# Patient Record
Sex: MALE
[De-identification: names, ages, dates, MRNs, and addresses within clinical notes are randomized per-mention and may not be internally consistent; named-entity substitution may affect disease eponyms.]

---

## 2018-02-11 ENCOUNTER — HOSPITAL ENCOUNTER (INPATIENT)
Dept: HOSPITAL 5 - ED | Age: 73
LOS: 6 days | DRG: 871 | End: 2018-02-17
Attending: INTERNAL MEDICINE | Admitting: INTERNAL MEDICINE
Payer: MEDICARE

## 2018-02-11 DIAGNOSIS — I47.1: ICD-10-CM

## 2018-02-11 DIAGNOSIS — Z23: ICD-10-CM

## 2018-02-11 DIAGNOSIS — D69.6: ICD-10-CM

## 2018-02-11 DIAGNOSIS — I27.20: ICD-10-CM

## 2018-02-11 DIAGNOSIS — F31.9: ICD-10-CM

## 2018-02-11 DIAGNOSIS — Z60.2: ICD-10-CM

## 2018-02-11 DIAGNOSIS — I50.33: ICD-10-CM

## 2018-02-11 DIAGNOSIS — J44.1: ICD-10-CM

## 2018-02-11 DIAGNOSIS — K21.9: ICD-10-CM

## 2018-02-11 DIAGNOSIS — K72.90: ICD-10-CM

## 2018-02-11 DIAGNOSIS — N39.0: ICD-10-CM

## 2018-02-11 DIAGNOSIS — Z82.49: ICD-10-CM

## 2018-02-11 DIAGNOSIS — D64.9: ICD-10-CM

## 2018-02-11 DIAGNOSIS — Z66: ICD-10-CM

## 2018-02-11 DIAGNOSIS — E43: ICD-10-CM

## 2018-02-11 DIAGNOSIS — K74.60: ICD-10-CM

## 2018-02-11 DIAGNOSIS — J69.0: ICD-10-CM

## 2018-02-11 DIAGNOSIS — E11.9: ICD-10-CM

## 2018-02-11 DIAGNOSIS — A41.51: Primary | ICD-10-CM

## 2018-02-11 DIAGNOSIS — F17.210: ICD-10-CM

## 2018-02-11 DIAGNOSIS — I11.0: ICD-10-CM

## 2018-02-11 DIAGNOSIS — F20.9: ICD-10-CM

## 2018-02-11 DIAGNOSIS — J96.01: ICD-10-CM

## 2018-02-11 DIAGNOSIS — Z88.2: ICD-10-CM

## 2018-02-11 LAB
%HYPO/RBC NFR BLD AUTO: (no result) %
ALBUMIN SERPL-MCNC: 2.5 G/DL (ref 3.9–5)
ALT SERPL-CCNC: 28 UNITS/L (ref 7–56)
ANISOCYTOSIS BLD QL SMEAR: (no result)
BACTERIA #/AREA URNS HPF: (no result) /HPF
BAND NEUTROPHILS # (MANUAL): 1.3 K/MM3
BILIRUB UR QL STRIP: (no result)
BLOOD UR QL VISUAL: (no result)
BUN SERPL-MCNC: 30 MG/DL (ref 9–20)
BUN/CREAT SERPL: 60 %
CALCIUM SERPL-MCNC: 9.4 MG/DL (ref 8.4–10.2)
HCT VFR BLD CALC: 32.3 % (ref 35.5–45.6)
HEMOLYSIS INDEX: 53
HGB BLD-MCNC: 9.9 GM/DL (ref 11.8–15.2)
INR PPP: 1.24 (ref 0.87–1.13)
MCH RBC QN AUTO: 25 PG (ref 28–32)
MCHC RBC AUTO-ENTMCNC: 31 % (ref 32–34)
MCV RBC AUTO: 81 FL (ref 84–94)
MUCOUS THREADS #/AREA URNS HPF: (no result) /HPF
MYELOCYTES # (MANUAL): 0 K/MM3
NITRITE UR QL STRIP: (no result)
PH UR STRIP: 6 [PH] (ref 5–7)
PLATELET # BLD: 60 K/MM3 (ref 140–440)
PROMYELOCYTES # (MANUAL): 0 K/MM3
PROT UR STRIP-MCNC: (no result) MG/DL
RBC # BLD AUTO: 4.01 M/MM3 (ref 3.65–5.03)
RBC #/AREA URNS HPF: 3 /HPF (ref 0–6)
TOTAL CELLS COUNTED BLD: 100
UROBILINOGEN UR-MCNC: 4 MG/DL (ref ?–2)
WBC #/AREA URNS HPF: 1 /HPF (ref 0–6)

## 2018-02-11 PROCEDURE — 36415 COLL VENOUS BLD VENIPUNCTURE: CPT

## 2018-02-11 PROCEDURE — 83880 ASSAY OF NATRIURETIC PEPTIDE: CPT

## 2018-02-11 PROCEDURE — 82805 BLOOD GASES W/O2 SATURATION: CPT

## 2018-02-11 PROCEDURE — 87040 BLOOD CULTURE FOR BACTERIA: CPT

## 2018-02-11 PROCEDURE — 94640 AIRWAY INHALATION TREATMENT: CPT

## 2018-02-11 PROCEDURE — 87086 URINE CULTURE/COLONY COUNT: CPT

## 2018-02-11 PROCEDURE — 85025 COMPLETE CBC W/AUTO DIFF WBC: CPT

## 2018-02-11 PROCEDURE — 94760 N-INVAS EAR/PLS OXIMETRY 1: CPT

## 2018-02-11 PROCEDURE — 81001 URINALYSIS AUTO W/SCOPE: CPT

## 2018-02-11 PROCEDURE — 85007 BL SMEAR W/DIFF WBC COUNT: CPT

## 2018-02-11 PROCEDURE — 80048 BASIC METABOLIC PNL TOTAL CA: CPT

## 2018-02-11 PROCEDURE — 85379 FIBRIN DEGRADATION QUANT: CPT

## 2018-02-11 PROCEDURE — 93010 ELECTROCARDIOGRAM REPORT: CPT

## 2018-02-11 PROCEDURE — 76604 US EXAM CHEST: CPT

## 2018-02-11 PROCEDURE — 85610 PROTHROMBIN TIME: CPT

## 2018-02-11 PROCEDURE — 96365 THER/PROPH/DIAG IV INF INIT: CPT

## 2018-02-11 PROCEDURE — 99406 BEHAV CHNG SMOKING 3-10 MIN: CPT

## 2018-02-11 PROCEDURE — 93306 TTE W/DOPPLER COMPLETE: CPT

## 2018-02-11 PROCEDURE — 82140 ASSAY OF AMMONIA: CPT

## 2018-02-11 PROCEDURE — 84443 ASSAY THYROID STIM HORMONE: CPT

## 2018-02-11 PROCEDURE — 87186 SC STD MICRODIL/AGAR DIL: CPT

## 2018-02-11 PROCEDURE — 87076 CULTURE ANAEROBE IDENT EACH: CPT

## 2018-02-11 PROCEDURE — 90686 IIV4 VACC NO PRSV 0.5 ML IM: CPT

## 2018-02-11 PROCEDURE — 96367 TX/PROPH/DG ADDL SEQ IV INF: CPT

## 2018-02-11 PROCEDURE — 83735 ASSAY OF MAGNESIUM: CPT

## 2018-02-11 PROCEDURE — 71275 CT ANGIOGRAPHY CHEST: CPT

## 2018-02-11 PROCEDURE — 82550 ASSAY OF CK (CPK): CPT

## 2018-02-11 PROCEDURE — 71046 X-RAY EXAM CHEST 2 VIEWS: CPT

## 2018-02-11 PROCEDURE — 84439 ASSAY OF FREE THYROXINE: CPT

## 2018-02-11 PROCEDURE — 93005 ELECTROCARDIOGRAM TRACING: CPT

## 2018-02-11 PROCEDURE — 90732 PPSV23 VACC 2 YRS+ SUBQ/IM: CPT

## 2018-02-11 PROCEDURE — 99285 EMERGENCY DEPT VISIT HI MDM: CPT

## 2018-02-11 PROCEDURE — 82553 CREATINE MB FRACTION: CPT

## 2018-02-11 PROCEDURE — 84484 ASSAY OF TROPONIN QUANT: CPT

## 2018-02-11 PROCEDURE — 80053 COMPREHEN METABOLIC PANEL: CPT

## 2018-02-11 PROCEDURE — 71045 X-RAY EXAM CHEST 1 VIEW: CPT

## 2018-02-11 PROCEDURE — 96375 TX/PRO/DX INJ NEW DRUG ADDON: CPT

## 2018-02-11 SDOH — SOCIAL STABILITY - SOCIAL INSECURITY: PROBLEMS RELATED TO LIVING ALONE: Z60.2

## 2018-02-11 NOTE — HISTORY AND PHYSICAL REPORT
History of Present Illness


Date of examination: 02/11/18


History of present illness: 


72-year-old man who has been on hospice, refocus hospice today to come to the 

emergency roomfor evaluation.  He has a history of COPD hep C, cirrhosis, CHF, 

they state that on Wednesday he felt, experienced generalized weakness and has 

a cough productive of brown phlegm, fever chills and shortness of breath


Review Of  Systems:


Constitutional: no weight loss


Ears, eyes, nose, mouth and throat: no nasal congestion, no nasal discharge, no 

sinus pressure, blurry vision, diplopia


Neck: No neck pain or rigidity.


Cardiovascular: No chest pain,  palpitations


Respiratory: + shortness of breath, cough


Gastrointestinal: No abdominal pain, hematochezia


Genitourinary : no dysuria, frequency , hematuria


Musculoskeletal: no muscle ache 


Integumentary: no rash, no pruritis


Neurological: no parathesias, focal weakness


Endocrine: no cold or heat intolerance, no polyuria or polydipsia


Hematologic/Lymphatic: no easy bruising, no easy bleeding, no gland swelling


Allergic/Immunologic: no urticaria, no angioedema.





PAST MEDICAL HISTORY: COPD hep C, cirrhosis, CHF





PAST SURGICAL HISTORY: None





FAMILY HISTORY: Hypertension





SOCIAL HISTORY: Smoked 1-1/2 packs a day,no alcohol or drugs








Medications and Allergies


 Allergies











Allergy/AdvReac Type Severity Reaction Status Date / Time


 


Sulfa (Sulfonamide Allergy  Unknown Verified 02/12/15 13:45





Antibiotics)     











 Home Medications











 Medication  Instructions  Recorded  Confirmed  Last Taken  Type


 


ALBUTEROL Inhaler [ProAir HFA 2 puff IH QID PRN #1 inhalation 10/10/13 07/30/15 

07/29/15 Rx





Inhaler]     


 


Metoprolol [Lopressor TAB] 25 mg PO BID #60 tablet 10/10/13 07/30/15 07/29/15 Rx


 


Multivitamin [Multi-Vitamin Daily] 1 each PO DAILY #30 tablet 10/10/13 07/30/15 

07/29/15 Rx


 


Folic Acid [Folvite] 1 mg PO QDAY #30 tablet 01/09/14 07/30/15 07/29/15 Rx


 


Multivitamin Tab [Multiple Vitamin 1 each PO DAILY 30 Days  tablet 01/09/14 07/

30/15 07/29/15 Rx





TAB (Theragran)]     


 


Thiamine [Vitamin B-1] 100 mg PO QDAY #30 tablet 01/09/14 07/30/15 07/29/15 Rx


 


Budesonide [Pulmicort Respules] 0.5 mg IH Q12HRT #1 nebu 02/18/15 07/30/15 07/29

/15 Rx


 


Furosemide [Lasix] 40 mg PO DAILY #30 tablet 02/18/15 07/30/15 07/29/15 Rx


 


HYDROcodone/APAP 5-325 [Norco 1 each PO Q6H PRN #30 tablet 02/18/15 07/30/15 07/

29/15 Rx





5-325 mg TAB]     


 


Ipratropium/Albuterol Sulfate 1 ampul IH TIDRT #30 ampul.neb 02/18/15 07/30/15 

07/29/15 Rx





[DUONEB *Not for PRN Use*]     


 


Pantoprazole [Protonix] 40 mg PO QDAY #30 tablet 02/18/15 07/30/15 07/29/15 Rx


 


Spironolactone [Aldactone] 50 mg PO QDAY #30 tablet 02/18/15 07/30/15 07/29/15 

Rx


 


predniSONE [Deltasone] 20 mg PO QDAY #18 tab 02/18/15 07/30/15 07/29/15 Rx











Active Meds: 


Active Medications





Ceftriaxone Sodium 1 gm/ (Sodium Chloride)  20 mls @ 20 mls/10 min IV ONCE.ED 

ONE


   Stop: 02/11/18 22:09











Exam





- Physical Exam


Narrative exam: 


Gen. appearance: Patient lying in bed in no acute distress


HEENT: Normocephalic/atraumatic, pupils equal round reactive to light, extra 

occular movement intact, no scleral icterus, no JVD or thyromegaly or nodule, 

neck is supple, mucous membrane moist, no erythema or exudate


Heart: S1-S2, regular rate and rhythm


Lungs: Crackles with decreased breath sounds l breathing comfortable


Abdomen: Positive bowel sounds, nontender, nondistended, no organomegaly


Extremities: No edema, cyanosis, clubbing


Neuro:: Oriented 3 , cranial nerves II-12 intact, speech, motor intact


Skin: No rash, nodules, warm dry








- Constitutional


Vitals: 


 











Temp Pulse Resp BP Pulse Ox


 


 98.2 F   89   16   119/63   99 


 


 02/11/18 16:32  02/11/18 16:32  02/11/18 16:32  02/11/18 18:30  02/11/18 18:00














Results





- Labs


CBC & Chem 7: 


 02/12/18 04:49





 02/12/18 04:49


Labs: 


 Abnormal lab results











  02/11/18 02/11/18 02/11/18 Range/Units





  16:40 16:40 16:40 


 


Hgb     (11.8-15.2)  gm/dl


 


Hct     (35.5-45.6)  %


 


MCV     (84-94)  fl


 


MCH     (28-32)  pg


 


MCHC     (32-34)  %


 


RDW     (13.2-15.2)  %


 


Plt Count     (140-440)  K/mm3


 


Lymphocytes % (Manual)     (13.4-35.0)  %


 


Lymphocytes # (Manual)     (1.2-5.4)  K/mm3


 


PT  16.3 H    (12.2-14.9)  Sec.


 


INR  1.24 H    (0.87-1.13)  


 


D-Dimer  1843.68 H    (0-234)  ng/mlDDU


 


Potassium   5.1 H   (3.6-5.0)  mmol/L


 


Chloride   97.4 L   ()  mmol/L


 


Carbon Dioxide   32 H   (22-30)  mmol/L


 


BUN   30 H   (9-20)  mg/dL


 


Creatinine   0.5 L   (0.8-1.5)  mg/dL


 


AST   58 H   (5-40)  units/L


 


NT-Pro-B Natriuret Pep    6520 H  (0-900)  pg/mL


 


Total Protein   5.9 L   (6.3-8.2)  g/dL


 


Albumin   2.5 L   (3.9-5)  g/dL














  02/11/18 Range/Units





  18:10 


 


Hgb  9.9 L  (11.8-15.2)  gm/dl


 


Hct  32.3 L  (35.5-45.6)  %


 


MCV  81 L  (84-94)  fl


 


MCH  25 L  (28-32)  pg


 


MCHC  31 L  (32-34)  %


 


RDW  24.6 H  (13.2-15.2)  %


 


Plt Count  60 L  (140-440)  K/mm3


 


Lymphocytes % (Manual)  3.0 L  (13.4-35.0)  %


 


Lymphocytes # (Manual)  0.2 L  (1.2-5.4)  K/mm3


 


PT   (12.2-14.9)  Sec.


 


INR   (0.87-1.13)  


 


D-Dimer   (0-234)  ng/mlDDU


 


Potassium   (3.6-5.0)  mmol/L


 


Chloride   ()  mmol/L


 


Carbon Dioxide   (22-30)  mmol/L


 


BUN   (9-20)  mg/dL


 


Creatinine   (0.8-1.5)  mg/dL


 


AST   (5-40)  units/L


 


NT-Pro-B Natriuret Pep   (0-900)  pg/mL


 


Total Protein   (6.3-8.2)  g/dL


 


Albumin   (3.9-5)  g/dL














- Imaging and Cardiology


EKG: image reviewed


Chest x-ray: image reviewed


CT scan - chest: report reviewed





Assessment and Plan


Assessment


Community-acquired pneumonia


CHF, stable/pleural effusion


Cirrhosis


COPD


Thrombocytopenia





Plan


Admit to medicine


Start IV Levaquin, follow cultures


Continue appropriate outpatient medication


DVT prophylaxis

## 2018-02-11 NOTE — CAT SCAN REPORT
FINAL REPORT



PROCEDURE:  CT ANGIO CHEST



TECHNIQUE:  Computerized tomographic angiography of the chest was

performed during the IV injection of iodinated nonionic contrast

including image processing. The image data was postprocessed

using 2-dimensional multiplanar reformatted (MPR) and

3-dimensional (MIP and/or volume rendered) techniques. 



HISTORY:  sob. elevated d-dimer. Evaluate pulmonary embolus 



COMPARISON:  No prior studies are available for comparison.



FINDINGS:  

Pulmonary outflow tract, right and left main pulmonary arteries

and their proximal branches: Clear, no filling defects seen to

suggest pulmonary embolus.



Pericardium: No evidence of pericardial effusion. Heart size

upper normal. 



Thoracic aorta: Atherosclerotic changes are visualized, no

evidence of aneurysmal dilatation or dissection.



Coronary arteries: Are partially calcified indicating

atherosclerotic disease.



Mediastinum and hilar regions: Nonspecific subcentimeter lymph

nodes are visualized. No pathologically enlarged lymph nodes or

masses are identified. 



Lung Fields: There is a moderate size right pleural effusion.

There is dense consolidation visualized in the right lower lobe.

Air bronchograms are visualized. Severe emphysematous changes

seen throughout both lungs. No discrete masses are identified.

Small amount of atelectasis seen in the left base. No effusion is

seen on the left. Moderate fibrosis seen in the upper lung fields

bilaterally. Numerous gas bubbles are seen in the superior aspect

of the right lower lobe. This could be related to the

emphysematous changes present. I cannot exclude developing

abscess. 



Upper abdomen: There appears to be a moderate amount of ascites

is collected along the edges of the liver and spleen. The edge of

the liver appears nodular suggesting cirrhosis. Varices are

suspected in the left upper quadrant although poorly visualized

on this arterial phase imaging. There is a 1.5 x 2.7 centimeter

nodule in the left adrenal gland.



Other: No acute bony abnormalities are identified.



IMPRESSION:  

No evidence of pulmonary embolus.



Moderate size right effusion present. Dense consolidations seen

throughout the right lower lobe as described suggesting severe

pneumonia. Emphysematous changes are present. Developing abscess

cannot be excluded. Please see above comments. Mass in the

consolidated right lower lobe cannot be entirely excluded. Small

amount of atelectasis seen in the left lung base.



Severe emphysematous changes are present. Moderate fibrosis seen

in the upper lung fields.



Moderate ascites visualized in the upper abdomen. Cirrhosis of

the liver suspected as well as portal hypertension.



Indeterminate nodule left adrenal gland. This could represent an

adrenal adenoma. Other masses are not excluded.



Atherosclerosis coronary arteries.

## 2018-02-11 NOTE — EMERGENCY DEPARTMENT REPORT
ED Chest Pain HPI





- General


Chief Complaint: Weakness


Stated Complaint: WEAKNESS


Time Seen by Provider: 02/11/18 16:40


Source: EMS


Mode of arrival: Stretcher


Limitations: Physical Limitation





- History of Present Illness


Initial Comments: 


This is a 72-year-old male that presents to the emergency room via EMS for 

weakness, shortness of breath, chest pain 1 week.  Patient states getting 

worse.  Patient A&O 4.  Patient states chest pain is left chest and 

nonradiating.  Patient states shortness of breath is worsening patient also 

complains of dyspnea on exertion.  Patient has had multiple falls due to 

weakness.  Patient recently replicate off of hospice to come to the emergency 

room.  patient was on hospice for end-stage liver disease.  Patient has a past 

medical history of congestive heart failure, COPD, diabetes, hypertension, 

hepatitis C, bipolar, schizophrenia, end-stage liver disease.  Patient states 

she wants everything done. 





MD Complaint: chest pain


-: Gradual, week(s)


Onset: during rest, during exertion


Pain Location: substernal, left chest


Pain Radiation: none


Severity: moderate, severe


Severity scale (0 -10): 5


Quality: tightness, aching, heaviness


Consistency: constant


Improves With: rest, leaning foward





- Related Data


 Previous Rx's











 Medication  Instructions  Recorded  Last Taken  Type


 


ALBUTEROL Inhaler [ProAir HFA 2 puff IH QID PRN #1 inhalation 10/10/13 07/29/15 

Rx





Inhaler]    


 


Metoprolol [Lopressor TAB] 25 mg PO BID #60 tablet 10/10/13 07/29/15 Rx


 


Multivitamin [Multi-Vitamin Daily] 1 each PO DAILY #30 tablet 10/10/13 07/29/15 

Rx


 


Folic Acid [Folvite] 1 mg PO QDAY #30 tablet 01/09/14 07/29/15 Rx


 


Multivitamin Tab [Multiple Vitamin 1 each PO DAILY 30 Days  tablet 01/09/14 07/

29/15 Rx





TAB (Theragran)]    


 


Thiamine [Vitamin B-1] 100 mg PO QDAY #30 tablet 01/09/14 07/29/15 Rx


 


Budesonide [Pulmicort Respules] 0.5 mg IH Q12HRT #1 nebu 02/18/15 07/29/15 Rx


 


Furosemide [Lasix] 40 mg PO DAILY #30 tablet 02/18/15 07/29/15 Rx


 


HYDROcodone/APAP 5-325 [Norco 1 each PO Q6H PRN #30 tablet 02/18/15 07/29/15 Rx





5-325 mg TAB]    


 


Ipratropium/Albuterol Sulfate 1 ampul IH TIDRT #30 ampul.neb 02/18/15 07/29/15 

Rx





[DUONEB *Not for PRN Use*]    


 


Pantoprazole [Protonix] 40 mg PO QDAY #30 tablet 02/18/15 07/29/15 Rx


 


Spironolactone [Aldactone] 50 mg PO QDAY #30 tablet 02/18/15 07/29/15 Rx


 


predniSONE [Deltasone] 20 mg PO QDAY #18 tab 02/18/15 07/29/15 Rx











 Allergies











Allergy/AdvReac Type Severity Reaction Status Date / Time


 


Sulfa (Sulfonamide Allergy  Unknown Verified 02/12/15 13:45





Antibiotics)     














Heart Score





- HEART Score


History: Slightly suspicious


EKG: Normal


Age: > 65


Risk factors: > 3 risk factors or hx of atherosclerotic disease


Troponin: < normal limit


HEART Score: 4





ED Review of Systems


ROS: 


Stated complaint: WEAKNESS


Other details as noted in HPI





Comment: All other systems reviewed and negative


Constitutional: malaise, weakness.  denies: chills, fever


Eyes: denies: eye pain, eye discharge, vision change


ENT: denies: ear pain, throat pain


Respiratory: cough, shortness of breath, SOB with exertion, SOB at rest.  denies

: wheezing


Cardiovascular: chest pain.  denies: palpitations


Endocrine: no symptoms reported


Gastrointestinal: denies: abdominal pain, nausea, diarrhea


Genitourinary: denies: urgency, dysuria


Musculoskeletal: denies: back pain, joint swelling, arthralgia


Skin: denies: rash, lesions


Neurological: weakness, abnormal gait.  denies: headache, paresthesias


Psychiatric: denies: anxiety, depression


Hematological/Lymphatic: denies: easy bleeding, easy bruising





ED Past Medical Hx





- Past Medical History


Previous Medical History?: Yes


Hx Hypertension: Yes


Hx Congestive Heart Failure: Yes


Hx Diabetes: No


Hx Psychiatric Treatment: Yes


Hx COPD: Yes


Additional medical history: Hep C, Bipolar, Schziophrenia him on patient was 

seen by psychiatry on his last admission who felt he did not meet criteria for 

schizophrenia or bipolar disorder.  Patient's major problem appears to be 

drinking.





- Surgical History


Additional Surgical History: unknown





- Family History


Family history: no significant, hypertension





- Social History


Smoking Status: Never Smoker


Substance Use Type: Alcohol





- Medications


Home Medications: 


 Home Medications











 Medication  Instructions  Recorded  Confirmed  Last Taken  Type


 


ALBUTEROL Inhaler [ProAir HFA 2 puff IH QID PRN #1 inhalation 10/10/13 07/30/15 

07/29/15 Rx





Inhaler]     


 


Metoprolol [Lopressor TAB] 25 mg PO BID #60 tablet 10/10/13 07/30/15 07/29/15 Rx


 


Multivitamin [Multi-Vitamin Daily] 1 each PO DAILY #30 tablet 10/10/13 07/30/15 

07/29/15 Rx


 


Folic Acid [Folvite] 1 mg PO QDAY #30 tablet 01/09/14 07/30/15 07/29/15 Rx


 


Multivitamin Tab [Multiple Vitamin 1 each PO DAILY 30 Days  tablet 01/09/14 07/

30/15 07/29/15 Rx





TAB (Theragran)]     


 


Thiamine [Vitamin B-1] 100 mg PO QDAY #30 tablet 01/09/14 07/30/15 07/29/15 Rx


 


Budesonide [Pulmicort Respules] 0.5 mg IH Q12HRT #1 nebu 02/18/15 07/30/15 07/29

/15 Rx


 


Furosemide [Lasix] 40 mg PO DAILY #30 tablet 02/18/15 07/30/15 07/29/15 Rx


 


HYDROcodone/APAP 5-325 [Norco 1 each PO Q6H PRN #30 tablet 02/18/15 07/30/15 07/

29/15 Rx





5-325 mg TAB]     


 


Ipratropium/Albuterol Sulfate 1 ampul IH TIDRT #30 ampul.neb 02/18/15 07/30/15 

07/29/15 Rx





[DUONEB *Not for PRN Use*]     


 


Pantoprazole [Protonix] 40 mg PO QDAY #30 tablet 02/18/15 07/30/15 07/29/15 Rx


 


Spironolactone [Aldactone] 50 mg PO QDAY #30 tablet 02/18/15 07/30/15 07/29/15 

Rx


 


predniSONE [Deltasone] 20 mg PO QDAY #18 tab 02/18/15 07/30/15 07/29/15 Rx














ED Physical Exam





- General


Limitations: Physical Limitation


General appearance: alert, in no apparent distress, cachectic





- Head


Head exam: Present: atraumatic, normocephalic





- Eye


Eye exam: Present: normal appearance, PERRL





- ENT


ENT exam: Present: mucous membranes dry, TM's normal bilaterally





- Neck


Neck exam: Present: normal inspection





- Respiratory


Respiratory exam: Present: normal lung sounds bilaterally.  Absent: respiratory 

distress





- Cardiovascular


Cardiovascular Exam: Present: regular rate, normal rhythm.  Absent: systolic 

murmur, diastolic murmur, rubs, gallop





- GI/Abdominal


GI/Abdominal exam: Present: soft, normal bowel sounds





- Rectal


Rectal exam: Present: deferred





- Extremities Exam


Extremities exam: Present: normal inspection





- Back Exam


Back exam: Present: normal inspection





- Neurological Exam


Neurological exam: Present: alert, oriented X3





- Psychiatric


Psychiatric exam: Present: normal affect, normal mood





- Skin


Skin exam: Present: warm, dry, intact, normal color.  Absent: rash





ED Course


 Vital Signs











  02/11/18 02/11/18 02/11/18





  15:53 16:32 16:48


 


Temperature 98.0 F 98.2 F 


 


Pulse Rate 66 89 


 


Respiratory 22 16 





Rate   


 


Blood Pressure 112/51  124/59


 


Blood Pressure  120/58 





[Right]   


 


O2 Sat by Pulse 93  





Oximetry   














  02/11/18 02/11/18 02/11/18





  17:01 17:31 18:00


 


Temperature   


 


Pulse Rate   


 


Respiratory   





Rate   


 


Blood Pressure  124/59 123/70


 


Blood Pressure   





[Right]   


 


O2 Sat by Pulse 95 97 99





Oximetry   














  02/11/18





  18:30


 


Temperature 


 


Pulse Rate 


 


Respiratory 





Rate 


 


Blood Pressure 119/63


 


Blood Pressure 





[Right] 


 


O2 Sat by Pulse 





Oximetry 














- Reevaluation(s)


Reevaluation #1: 





02/11/18 18:11.  Patient ready for admission however waiting on CTA results.  

Patient seen and no changes





Reevaluation #2: 





02/11/18 21:12. pt ready for admission. cta neg. discussed with dr mcdaniel.  

Hospitalist will admit.  








PATRICK score





- Patrick Score


Age > 65: (1) Yes


Aspirin use within the Past 7 Days: (0) No


3 or more CAD Risk Factors: (1) Yes


2 or more Angina events in past 24 hrs: (1) Yes


Known CAD with more than 50% Stenosis: (0) No


Elevated Cardiac Markers: (0) No


ST Deviation Greater than 0.5mm: (0) No


PATRICK Score: 3





ED Medical Decision Making





- Lab Data


Result diagrams: 


 02/11/18 18:10





 02/11/18 16:40





- EKG Data


-: EKG Interpreted by Me


EKG shows normal: sinus rhythm, axis (right)


Rate: normal





- Radiology Data


Radiology results: report reviewed, image reviewed


Right lung pneumonia


.  CTA no PE





- Medical Decision Making


Will admit patient to hospital for further evaluation.  Discussed case with the 

hospitalist.  Hospitalist will admit.  Discussed all labs with family and 

patient. 








- Differential Diagnosis


chf. failure to thrive.  Pneumonia


Critical Care Time: No


Critical care attestation.: 


If time is entered above; I have spent that time in minutes in the direct care 

of this critically ill patient, excluding procedure time.








ED Disposition


Clinical Impression: 


 Chest pain, Shortness of breath, Weakness, Pneumonia, Elevated d-dimer, CHF 

exacerbation, CHF (congestive heart failure), Liver cirrhosis, COPD exacerbation

, Hypertension, Anemia, Thrombocytopenia due to diminished platelet production





Disposition: [id number] OP ADMIT IP TO THIS HOSP


Is pt being admited?: Yes


Does the pt Need Aspirin: No


Condition: Critical


Time of Disposition: 21:05

## 2018-02-11 NOTE — XRAY REPORT
FINAL REPORT



EXAM:  XR CHEST 1V AP



HISTORY:  possible Sepsis 



TECHNIQUE:  Chest single AP 



PRIORS:  None.



FINDINGS:  

There is confluent patchy opacity within the right lower lobe

distribution. Positioning is rotated. Left lung demonstrates no

acute change. There is mild pulmonary interstitial prominence

bilaterally most likely chronic. Cardiac and mediastinal contours

are unremarkable. 



IMPRESSION:  

Right lower lobe pulmonary infiltrates suspicious for pneumonia

## 2018-02-12 LAB
%HYPO/RBC NFR BLD AUTO: (no result) %
ANISOCYTOSIS BLD QL SMEAR: (no result)
BAND NEUTROPHILS # (MANUAL): 0.2 K/MM3
BUN SERPL-MCNC: 29 MG/DL (ref 9–20)
BUN/CREAT SERPL: 58 %
CALCIUM SERPL-MCNC: 9.4 MG/DL (ref 8.4–10.2)
HCT VFR BLD CALC: 35 % (ref 35.5–45.6)
HEMOLYSIS INDEX: 5
HGB BLD-MCNC: 11.1 GM/DL (ref 11.8–15.2)
MCH RBC QN AUTO: 25 PG (ref 28–32)
MCHC RBC AUTO-ENTMCNC: 32 % (ref 32–34)
MCV RBC AUTO: 78 FL (ref 84–94)
MYELOCYTES # (MANUAL): 0.1 K/MM3
PLATELET # BLD: 67 K/MM3 (ref 140–440)
PROMYELOCYTES # (MANUAL): 0 K/MM3
RBC # BLD AUTO: 4.49 M/MM3 (ref 3.65–5.03)
TOTAL CELLS COUNTED BLD: 100

## 2018-02-12 RX ADMIN — PANTOPRAZOLE SODIUM SCH MG: 40 TABLET, DELAYED RELEASE ORAL at 10:45

## 2018-02-12 RX ADMIN — ENOXAPARIN SODIUM SCH MG: 100 INJECTION SUBCUTANEOUS at 22:22

## 2018-02-12 RX ADMIN — LEVOFLOXACIN SCH MLS/HR: 5 INJECTION, SOLUTION INTRAVENOUS at 10:45

## 2018-02-12 RX ADMIN — BUDESONIDE SCH: 0.5 INHALANT RESPIRATORY (INHALATION) at 21:52

## 2018-02-12 RX ADMIN — IPRATROPIUM BROMIDE AND ALBUTEROL SULFATE SCH: .5; 3 SOLUTION RESPIRATORY (INHALATION) at 21:52

## 2018-02-12 RX ADMIN — FOLIC ACID SCH MG: 1 TABLET ORAL at 10:45

## 2018-02-12 RX ADMIN — AZITHROMYCIN SCH MLS/HR: 500 INJECTION, POWDER, LYOPHILIZED, FOR SOLUTION INTRAVENOUS at 22:23

## 2018-02-12 RX ADMIN — Medication SCH MG: at 10:46

## 2018-02-12 RX ADMIN — IPRATROPIUM BROMIDE AND ALBUTEROL SULFATE SCH: .5; 3 SOLUTION RESPIRATORY (INHALATION) at 21:51

## 2018-02-12 RX ADMIN — BUDESONIDE SCH: 0.5 INHALANT RESPIRATORY (INHALATION) at 21:51

## 2018-02-12 RX ADMIN — SPIRONOLACTONE SCH MG: 50 TABLET ORAL at 10:45

## 2018-02-12 RX ADMIN — AZITHROMYCIN SCH MLS/HR: 500 INJECTION, POWDER, LYOPHILIZED, FOR SOLUTION INTRAVENOUS at 00:05

## 2018-02-12 RX ADMIN — OXYCODONE AND ACETAMINOPHEN PRN TAB: 5; 325 TABLET ORAL at 11:05

## 2018-02-12 RX ADMIN — IPRATROPIUM BROMIDE AND ALBUTEROL SULFATE SCH AMPUL: .5; 3 SOLUTION RESPIRATORY (INHALATION) at 21:52

## 2018-02-12 RX ADMIN — PREDNISONE SCH MG: 20 TABLET ORAL at 10:45

## 2018-02-12 RX ADMIN — OXYCODONE AND ACETAMINOPHEN PRN TAB: 5; 325 TABLET ORAL at 02:32

## 2018-02-12 NOTE — PROGRESS NOTE
Assessment and Plan


Assessment and plan: 





72-year-old man who has been on hospice, refocus hospice today to come to the 

emergency roomfor evaluation.  He has a history of COPD hep C, cirrhosis, CHF, 

they state that on Wednesday he felt, experienced generalized weakness and has 

a cough productive of brown phlegm, fever chills and shortness of breath





dx


Community-acquired pneumonia


acute on chronic diastolic CHF


Cirrhosis


COPD exacerbation


Acute hypoxic respiratory failure


Thrombocytopenia





Plan


* IV Levaquin, follow  blood cultures


* IV lasix, echo and cardiology consult


* nebs, steroids, abx and pulmonary consult


* continue oxygen supplementation


* Plt count is chronically low due to hypersplenism, monitor, may continue dvt 

ppx as long as it is above 50


* dvt ppx -lovenox








History


Interval history: 





still c/o cough, sob, weakness, and lethargy


no fevers, no chills


does not feel he has improved





Hospitalist Physical





- Constitutional


Vitals: 


 











Temp Pulse Resp BP Pulse Ox


 


 98.7 F   102 H  28 H  129/69   93 


 


 02/12/18 14:17  02/12/18 14:17  02/12/18 14:17  02/12/18 14:17  02/12/18 14:17











General appearance: Present: cachectic





- EENT


Eyes: Present: PERRL


ENT: hearing intact





- Neck


Neck: Present: supple





- Respiratory


Respiratory effort: pursed lips


Respiratory: bilateral: diminished, wheezing





- Cardiovascular


Rhythm: regular


Heart Sounds: Present: S1 & S2





- Extremities


Extremities: no ischemia


Peripheral Pulses: within normal limits





- Abdominal


General gastrointestinal: soft, non-tender





- Integumentary


Integumentary: Present: clear, warm, dry





- Psychiatric


Psychiatric: appropriate mood/affect, intact judgment & insight, cooperative





- Neurologic


Neurologic: CNII-XII intact, moves all extremities





Results





- Labs


CBC & Chem 7: 


 02/12/18 04:49





 02/12/18 04:49


Labs: 


 Laboratory Last Values











WBC  6.9 K/mm3 (4.5-11.0)   02/12/18  04:49    


 


RBC  4.49 M/mm3 (3.65-5.03)   02/12/18  04:49    


 


Hgb  11.1 gm/dl (11.8-15.2)  L  02/12/18  04:49    


 


Hct  35.0 % (35.5-45.6)  L  02/12/18  04:49    


 


MCV  78 fl (84-94)  L  02/12/18  04:49    


 


MCH  25 pg (28-32)  L  02/12/18  04:49    


 


MCHC  32 % (32-34)   02/12/18  04:49    


 


RDW  24.4 % (13.2-15.2)  H  02/12/18  04:49    


 


Plt Count  67 K/mm3 (140-440)  L  02/12/18  04:49    


 


Add Manual Diff  Complete   02/12/18  04:49    


 


Total Counted  100   02/12/18  04:49    


 


Seg Neutrophils %  Np   02/12/18  04:49    


 


Seg Neuts % (Manual)  96.0 % (40.0-70.0)  H  02/12/18  04:49    


 


Band Neutrophils %  3.0 %  02/12/18  04:49    


 


Lymphocytes % (Manual)  0 % (13.4-35.0)  L  02/12/18  04:49    


 


Reactive Lymphs % (Man)  0 %  02/12/18  04:49    


 


Monocytes % (Manual)  0 % (0.0-7.3)   02/12/18  04:49    


 


Eosinophils % (Manual)  0 % (0.0-4.3)   02/12/18  04:49    


 


Basophils % (Manual)  0 % (0.0-1.8)   02/12/18  04:49    


 


Metamyelocytes %  0 %  02/12/18  04:49    


 


Myelocytes %  1.0 %  02/12/18  04:49    


 


Promyelocytes %  0 %  02/12/18  04:49    


 


Blast Cells %  0 %  02/12/18  04:49    


 


Nucleated RBC %  Not Reportable   02/12/18  04:49    


 


Seg Neutrophils # Man  6.6 K/mm3 (1.8-7.7)   02/12/18  04:49    


 


Band Neutrophils #  0.2 K/mm3  02/12/18  04:49    


 


Lymphocytes # (Manual)  0.0 K/mm3 (1.2-5.4)  L  02/12/18  04:49    


 


Abs React Lymphs (Man)  0.0 K/mm3  02/12/18  04:49    


 


Monocytes # (Manual)  0.0 K/mm3 (0.0-0.8)   02/12/18  04:49    


 


Eosinophils # (Manual)  0.0 K/mm3 (0.0-0.4)   02/12/18  04:49    


 


Basophils # (Manual)  0.0 K/mm3 (0.0-0.1)   02/12/18  04:49    


 


Metamyelocytes #  0.0 K/mm3  02/12/18  04:49    


 


Myelocytes #  0.1 K/mm3  02/12/18  04:49    


 


Promyelocytes #  0.0 K/mm3  02/12/18  04:49    


 


Blast Cells #  0.0 K/mm3  02/12/18  04:49    


 


WBC Morphology  Not Reportable   02/12/18  04:49    


 


Hypersegmented Neuts  Not Reportable   02/12/18  04:49    


 


Hyposegmented Neuts  Not Reportable   02/12/18  04:49    


 


Hypogranular Neuts  Not Reportable   02/12/18  04:49    


 


Smudge Cells  Not Reportable   02/12/18  04:49    


 


Toxic Granulation  Not Reportable   02/12/18  04:49    


 


Toxic Vacuolation  Not Reportable   02/12/18  04:49    


 


Dohle Bodies  Not Reportable   02/12/18  04:49    


 


Pelger-Huet Anomaly  Not Reportable   02/12/18  04:49    


 


Afshin Rods  Not Reportable   02/12/18  04:49    


 


Platelet Estimate  Appears decreased   02/12/18  04:49    


 


Clumped Platelets  Not Reportable   02/12/18  04:49    


 


Plt Clumps, EDTA  Not Reportable   02/12/18  04:49    


 


Large Platelets  Not Reportable   02/12/18  04:49    


 


Giant Platelets  Not Reportable   02/12/18  04:49    


 


Platelet Satelliting  Not Reportable   02/12/18  04:49    


 


Plt Morphology Comment  Not Reportable   02/12/18  04:49    


 


RBC Morphology  Not Reportable   02/12/18  04:49    


 


Dimorphic RBCs  Not Reportable   02/12/18  04:49    


 


Polychromasia  Not Reportable   02/12/18  04:49    


 


Hypochromasia  1+   02/12/18  04:49    


 


Poikilocytosis  Not Reportable   02/12/18  04:49    


 


Anisocytosis  2+   02/12/18  04:49    


 


Microcytosis  Not Reportable   02/12/18  04:49    


 


Macrocytosis  Not Reportable   02/12/18  04:49    


 


Spherocytes  Not Reportable   02/12/18  04:49    


 


Pappenheimer Bodies  Not Reportable   02/12/18  04:49    


 


Sickle Cells  Not Reportable   02/12/18  04:49    


 


Target Cells  Few   02/12/18  04:49    


 


Tear Drop Cells  Not Reportable   02/12/18  04:49    


 


Ovalocytes  Not Reportable   02/12/18  04:49    


 


Helmet Cells  Not Reportable   02/12/18  04:49    


 


Davidson-Guinda Bodies  Not Reportable   02/12/18  04:49    


 


Cabot Rings  Not Reportable   02/12/18  04:49    


 


Ravin Cells  Not Reportable   02/12/18  04:49    


 


Bite Cells  Not Reportable   02/12/18  04:49    


 


Crenated Cell  Not Reportable   02/12/18  04:49    


 


Elliptocytes  Not Reportable   02/12/18  04:49    


 


Acanthocytes (Spur)  Not Reportable   02/12/18  04:49    


 


Rouleaux  Not Reportable   02/12/18  04:49    


 


Hemoglobin C Crystals  Not Reportable   02/12/18  04:49    


 


Schistocytes  Not Reportable   02/12/18  04:49    


 


Malaria parasites  Not Reportable   02/12/18  04:49    


 


Malik Bodies  Not Reportable   02/12/18  04:49    


 


Hem Pathologist Commnt  No   02/12/18  04:49    


 


PT  16.3 Sec. (12.2-14.9)  H  02/11/18  16:40    


 


INR  1.24  (0.87-1.13)  H  02/11/18  16:40    


 


D-Dimer  1843.68 ng/mlDDU (0-234)  H  02/11/18  16:40    


 


VBG pH  7.395  (7.320-7.420)   02/11/18  16:40    


 


Sodium  140 mmol/L (137-145)   02/12/18  04:49    


 


Potassium  4.8 mmol/L (3.6-5.0)   02/12/18  04:49    


 


Chloride  95.7 mmol/L ()  L  02/12/18  04:49    


 


Carbon Dioxide  31 mmol/L (22-30)  H  02/12/18  04:49    


 


Anion Gap  18 mmol/L  02/12/18  04:49    


 


BUN  29 mg/dL (9-20)  H  02/12/18  04:49    


 


Creatinine  0.5 mg/dL (0.8-1.5)  L  02/12/18  04:49    


 


Estimated GFR  > 60 ml/min  02/12/18  04:49    


 


BUN/Creatinine Ratio  58 %  02/12/18  04:49    


 


Glucose  90 mg/dL ()   02/12/18  04:49    


 


Lactic Acid  1.30 mmol/L (0.7-2.0)   02/11/18  18:10    


 


Calcium  9.4 mg/dL (8.4-10.2)   02/12/18  04:49    


 


Total Bilirubin  1.00 mg/dL (0.1-1.2)   02/11/18  16:40    


 


AST  58 units/L (5-40)  H  02/11/18  16:40    


 


ALT  28 units/L (7-56)   02/11/18  16:40    


 


Alkaline Phosphatase  88 units/L ()   02/11/18  16:40    


 


Ammonia  41.0 umol/L (25-60)   02/11/18  16:40    


 


Total Creatine Kinase  144 units/L ()   02/11/18  16:40    


 


CK-MB (CK-2)  2.1 ng/mL (0.0-4.0)   02/11/18  16:40    


 


CK-MB (CK-2) Rel Index  1.4  (0-4)   02/11/18  16:40    


 


Troponin T  < 0.010 ng/mL (0.00-0.029)   02/11/18  16:40    


 


NT-Pro-B Natriuret Pep  6520 pg/mL (0-900)  H  02/11/18  16:40    


 


Total Protein  5.9 g/dL (6.3-8.2)  L  02/11/18  16:40    


 


Albumin  2.5 g/dL (3.9-5)  L  02/11/18  16:40    


 


Albumin/Globulin Ratio  0.7 %  02/11/18  16:40    


 


Urine Color  Nelly  (Yellow)   02/11/18  18:00    


 


Urine Turbidity  Clear  (Clear)   02/11/18  18:00    


 


Urine pH  6.0  (5.0-7.0)   02/11/18  18:00    


 


Ur Specific Gravity  1.024  (1.003-1.030)   02/11/18  18:00    


 


Urine Protein  <15 mg/dl mg/dL (Negative)   02/11/18  18:00    


 


Urine Glucose (UA)  Neg mg/dL (Negative)   02/11/18  18:00    


 


Urine Ketones  Tr mg/dL (Negative)   02/11/18  18:00    


 


Urine Blood  Neg  (Negative)   02/11/18  18:00    


 


Urine Nitrite  Neg  (Negative)   02/11/18  18:00    


 


Urine Bilirubin  Neg  (Negative)   02/11/18  18:00    


 


Urine Urobilinogen  4.0 mg/dL (<2.0)   02/11/18  18:00    


 


Ur Leukocyte Esterase  Neg  (Negative)   02/11/18  18:00    


 


Urine WBC (Auto)  1.0 /HPF (0.0-6.0)   02/11/18  18:00    


 


Urine RBC (Auto)  3.0 /HPF (0.0-6.0)   02/11/18  18:00    


 


Urine Bacteria (Auto)  4+ /HPF (Negative)   02/11/18  18:00    


 


Urine Mucus  3+ /HPF  02/11/18  18:00

## 2018-02-13 LAB
BUN SERPL-MCNC: 48 MG/DL (ref 9–20)
BUN/CREAT SERPL: 48 %
CALCIUM SERPL-MCNC: 9.3 MG/DL (ref 8.4–10.2)
HEMOLYSIS INDEX: 15
MAGNESIUM SERPL-MCNC: 1.7 MG/DL (ref 1.7–2.3)

## 2018-02-13 PROCEDURE — 3E0234Z INTRODUCTION OF SERUM, TOXOID AND VACCINE INTO MUSCLE, PERCUTANEOUS APPROACH: ICD-10-PCS | Performed by: INTERNAL MEDICINE

## 2018-02-13 RX ADMIN — PREDNISONE SCH MG: 20 TABLET ORAL at 09:49

## 2018-02-13 RX ADMIN — BUDESONIDE SCH: 0.5 INHALANT RESPIRATORY (INHALATION) at 22:28

## 2018-02-13 RX ADMIN — METOPROLOL TARTRATE SCH MG: 25 TABLET, FILM COATED ORAL at 12:10

## 2018-02-13 RX ADMIN — IPRATROPIUM BROMIDE AND ALBUTEROL SULFATE SCH AMPUL: .5; 3 SOLUTION RESPIRATORY (INHALATION) at 12:47

## 2018-02-13 RX ADMIN — FOLIC ACID SCH MG: 1 TABLET ORAL at 09:48

## 2018-02-13 RX ADMIN — IPRATROPIUM BROMIDE AND ALBUTEROL SULFATE SCH: .5; 3 SOLUTION RESPIRATORY (INHALATION) at 22:28

## 2018-02-13 RX ADMIN — PANTOPRAZOLE SODIUM SCH MG: 40 TABLET, DELAYED RELEASE ORAL at 09:49

## 2018-02-13 RX ADMIN — BUDESONIDE SCH MG: 0.5 INHALANT RESPIRATORY (INHALATION) at 12:48

## 2018-02-13 RX ADMIN — Medication SCH MG: at 09:49

## 2018-02-13 RX ADMIN — SPIRONOLACTONE SCH MG: 50 TABLET ORAL at 09:48

## 2018-02-13 RX ADMIN — ENOXAPARIN SODIUM SCH MG: 100 INJECTION SUBCUTANEOUS at 22:08

## 2018-02-13 RX ADMIN — FUROSEMIDE SCH MG: 10 INJECTION, SOLUTION INTRAVENOUS at 06:03

## 2018-02-13 RX ADMIN — AZITHROMYCIN SCH MG: 250 TABLET, FILM COATED ORAL at 22:09

## 2018-02-13 RX ADMIN — LEVOFLOXACIN SCH MLS/HR: 5 INJECTION, SOLUTION INTRAVENOUS at 09:49

## 2018-02-13 RX ADMIN — METOPROLOL TARTRATE SCH MG: 25 TABLET, FILM COATED ORAL at 22:08

## 2018-02-13 RX ADMIN — IPRATROPIUM BROMIDE AND ALBUTEROL SULFATE SCH: .5; 3 SOLUTION RESPIRATORY (INHALATION) at 14:11

## 2018-02-13 NOTE — CONSULTATION
History of Present Illness


Consult date: 02/13/18


Requesting physician: COURTNEY REYNA


Consult reason: congestive heart failure


History of present illness: 


The pt is a 73 YO male with a past medical history significant for end stage 

liver cirrhosis, hepatitis C, portopulmonary HTN, COPD, ETOH use, diastolic HF, 

HTN, DM, GERD, biopolar disorder, schizophrenia. He has been seen by our 

practice on prior hospitalizations. He presented with complaints of SOB and 

weakness. He reports that due to weakness, he has fallen at home several times 

over the past few weeks. He is a rather poor historian and provides no 

additional details. He denies any chest pain, palpitations, n/v, diaphoresis, 

dizziness or syncope. At one time, he was on hospice for end-stage liver 

disease. He reports that he is currently living alone in his home. Following 

admission, chest CTA negative for PE, showed moderate sized right pleural 

effusion, dense consolidations throughout the RLL suggesting severe PNA, severe 

emphysematous changes, moderate fibrosis in upper lung fields. Following 

evaluation, pt was noted to develop SVT on telemetry with -180s. He 

spontaneously converted to SR with no intervention. 





Echo done 2/2015 showed EF 60-65%, pseudonormalization, LA mildly dilated, RV 

systolic function mildly reduced, RA mildly dilated, RVSP 9mmHg, large left 

sided pleural effusion.  








Past History


Past Medical History: COPD, heart failure, hepatitis, liver disease (1)


Social history: Lives alone, alcohol abuse





Medications and Allergies


 Allergies











Allergy/AdvReac Type Severity Reaction Status Date / Time


 


Sulfa (Sulfonamide Allergy  Unknown Verified 02/12/15 13:45





Antibiotics)     











 Home Medications











 Medication  Instructions  Recorded  Confirmed  Last Taken  Type


 


Spironolactone [Aldactone] 50 mg PO QDAY #30 tablet 02/18/15 02/12/18 07/29/15 

Rx


 


Ambrisentan [Letairis] 10 mg PO QDAY 02/12/18 02/12/18 Unknown History


 


Cyanocobalamin [Vitamin B-12] 1,000 mcg IM Q30D 02/12/18 02/12/18 Unknown 

History


 


Ipratropium/Albuterol Sulfate 1 ampul IH QID 02/12/18 02/12/18 Unknown History





[DUONEB *Not for PRN Use*]     


 


Omeprazole 20 mg PO QDAY 02/12/18 02/12/18 Unknown History


 


Sildenafil [Revatio] 20 mg PO TID 02/12/18 02/12/18 Unknown History


 


Spironolactone [Aldactone] 50 mg PO QDAY 02/12/18 02/12/18 Unknown History


 


Tiotropium Bromide [Spiriva] 18 mcg IH DAILY 02/12/18 02/12/18 Unknown History











Active Meds: 


Active Medications





Acetaminophen (Tylenol)  650 mg PO Q4H PRN


   PRN Reason: Pain MILD(1-3)/Fever >100.5/HA


Acetaminophen/Hydrocodone Bitart (Norco 5/325)  1 each PO Q6H PRN


   PRN Reason: Pain, Moderate (4-6)


   Last Admin: 02/12/18 22:25 Dose:  1 each


Albuterol (Proventil)  2.5 mg IH QIDRT PRN


   PRN Reason: Shortness Of Breath


Albuterol/Ipratropium (Duoneb *Not For Prn Use*)  1 ampul IH TIDRT [facility]


   Last Admin: 02/12/18 21:52 Dose:  1 ampul


Bisacodyl (Dulcolax)  10 mg MS QDAY PRN


   PRN Reason: Constipation unrelieved by MOM


Budesonide (Pulmicort)  0.5 mg IH Q12HRT [facility]


   Last Admin: 02/12/18 21:52 Dose:  Not Given


Enoxaparin Sodium (Lovenox)  40 mg SUB-Q QDAY@2200 [facility]


   Last Admin: 02/12/18 22:22 Dose:  40 mg


Folic Acid (Folvite)  1 mg PO QDAY [facility]


   Last Admin: 02/12/18 10:45 Dose:  1 mg


Furosemide (Lasix)  40 mg IV 0600,1800 [facility]


   Last Admin: 02/13/18 06:03 Dose:  40 mg


Azithromycin 500 mg/ Sodium (Chloride)  250 mls @ 250 mls/hr IV Q24H [facility]


   PRN Reason: Protocol


   Last Admin: 02/12/18 22:23 Dose:  250 mls/hr


Levofloxacin/Dextrose (Levaquin 750mg/150ml)  750 mg in 150 mls @ 100 mls/hr IV 

Q24HR [facility]


   PRN Reason: Protocol


   Last Admin: 02/12/18 10:45 Dose:  100 mls/hr


Influenza Virus Vaccine Quadrival (Fluarix Quad [id number](36 Mos+)  0.5 ml IM 

.ONCE ONE


   Stop: 02/13/18 12:01


Magnesium Hydroxide (Milk Of Magnesia)  30 ml PO Q4H PRN


   PRN Reason: Constipation


Methylprednisolone Sodium Succinate (Solu-Medrol)  125 mg IV Q8HR [facility]


   Last Admin: 02/13/18 06:03 Dose:  125 mg


Ondansetron HCl (Zofran)  4 mg IV Q8H PRN


   PRN Reason: N/V unrelieved by Reglan


   Last Admin: 02/12/18 14:02 Dose:  4 mg


Oxycodone/Acetaminophen (Percocet 5/325)  1 tab PO Q6H PRN


   PRN Reason: Pain, Moderate (4-6)


   Last Admin: 02/12/18 11:05 Dose:  1 tab


Pantoprazole Sodium (Protonix)  40 mg PO QDAY [facility]


   Last Admin: 02/12/18 10:45 Dose:  40 mg


Pneumococcal Polyvalent Vaccine (Pneumovax 23)  0.5 ml IM .ONCE ONE


   Stop: 02/13/18 12:01


Prednisone (Deltasone)  20 mg PO QDAY [facility]


   Last Admin: 02/12/18 10:45 Dose:  20 mg


Spironolactone (Aldactone)  50 mg PO QDAY [facility]


   Last Admin: 02/12/18 10:45 Dose:  50 mg


Thiamine HCl (Vitamin B-1)  100 mg PO QDAY [facility]


   Last Admin: 02/12/18 10:46 Dose:  100 mg











Review of Systems


Constitutional: weakness, no fever, no chills


Ears, nose, mouth and throat: no ear pain, no nose pain, no sinus pressure, no 

sinus pain


Cardiovascular: shortness of breath, no chest pain, no orthopnea, no 

palpitations, no rapid/irregular heart beat, no edema, no syncope, no 

lightheadedness


Respiratory: shortness of breath, no congestion, no wheezing, no pain on 

inspiration


Gastrointestinal: no nausea, no vomiting


Genitourinary Male: no dysuria


Musculoskeletal: no neck stiffness, no neck pain


Integumentary: no rash, no pruritis, no redness, no sores


Neurological: no head injury


Endocrine: no cold intolerance, no heat intolerance


Hematologic/Lymphatic: no easy bruising, no easy bleeding


Allergic/Immunologic: no urticaria, no wheezing





Physical Examination


 Vital Signs











Temp Pulse Resp BP Pulse Ox


 


 98.0 F   66   16   112/51   93 


 


 02/11/18 15:53  02/11/18 15:53  02/11/18 15:53  02/11/18 15:53  02/11/18 15:53











General appearance: no acute distress, cachectic


HEENT: Positive: PERRL, Normocephaly, Mucus Membranes Moist


Neck: Positive: neck supple, trachea midline


Cardiac: Positive: Reg Rate and Rhythm, S1/S2, Systolic Murmur


Lungs: Positive: Decreased Breath Sounds, Rhonchi


Neuro: Positive: Grossly Intact


Abdomen: Negative: Tender


Skin: Negative: Rash


Musculoskeletal: No Fluid Collection, No Pain


Extremities: Absent: edema





Results





 02/12/18 04:49





 02/12/18 04:49





- Imaging and Cardiology


Echo: report reviewed (2/2015 showed EF 60-65%, pseudonormalization, LA mildly 

dilated, RV systolic function mildly reduced, RA mildly dilated, RVSP 9mmHg, 

large left sided pleural effusion)


EKG: report reviewed, image reviewed





EKG interpretations





- Telemetry


EKG Rhythm: Sinus Rhythm





- EKG


Sinus rhythms and dysrhythmias: sinus rhythm





Assessment and Plan


Assessment:


Transient SVT --> SR 


PNA


Acute diastolic HF


End stage liver cirrhosis / H/o hepatitis C


H/o portopulmonary HTN


COPD


Thrombocytopenia 


H/o ETOH use


HTN


DM


GERD


H/o biopolar disorder & schizophrenia


 


Plan: 


Obtain BMP, Mg, thyroid profile. 


Initiate lopressor. 


F/u echo. 


Transfer to telemetry. 


Abx per primary. 





The patient has been seen in conjunction with Dr. Jo who agrees with the 

assessment and plan of care.

## 2018-02-13 NOTE — PROGRESS NOTE
Assessment and Plan


Assessment and plan: 


--Supraventricular tachycardia;


Converted with adenosine, continue beta blockers and supportive care


Cardiology following





--Community-acquired pneumonia; 


continue current antibiotics, oxygen titrated O2 sats more than 90%, supportive 

care





--Acute exacerbation of COPD;


Oxygen, nebulizers, IV steroids, IV antibiotics, pulmonary evaluation as needed





--Chronic Thrombocytopenia; no evidence of bleeding


Closely monitor





--DVT prophylaxis with Lovenox





--DC planning to case management


Discharge to SNF with possible hospice when medically stable





--Full CODE STATUS





Plan of care discussed with the patient, his sister at the bedside and the nurse


Patient's sister had a lot of questions answered all of them




















History


Interval history: 


Patient seen and evaluated in his room medical records reviewed


Family member at the bedside


Patient had an episode of SVT, spondylitic to adenosine and metoprolol 


evaluated by cardiology, heart rate is in 90s


Patient denies any chest pain or shortness of breath


Alert awake cachectic, chronically looking


Not in acute distress


Vital signs reviewed








Hospitalist Physical





- Constitutional


Vitals: 


 











Temp Pulse Resp BP Pulse Ox


 


 97.6 F   93 H  19   127/84   97 


 


 02/13/18 04:12  02/13/18 09:48  02/13/18 08:40  02/13/18 08:40  02/13/18 04:12











General appearance: Present: no acute distress, cachectic, disheveled





- EENT


Eyes: Present: PERRL, EOM intact





- Neck


Neck: Present: supple, normal ROM





- Respiratory


Respiratory effort: normal


Respiratory: bilateral: diminished, negative: rales, rhonchi, wheezing





- Cardiovascular


Rhythm: regular


Heart Sounds: Present: S1 & S2





- Extremities


Extremities: no ischemia, No edema





- Abdominal


General gastrointestinal: soft, non-tender, non-distended, normal bowel sounds





- Integumentary


Integumentary: Present: clear, warm





- Psychiatric


Psychiatric: appropriate mood/affect, cooperative





- Neurologic


Neurologic: CNII-XII intact, moves all extremities





Results





- Labs


CBC & Chem 7: 


 02/12/18 04:49





 02/13/18 10:19


Labs: 


 Laboratory Last Values











WBC  6.9 K/mm3 (4.5-11.0)   02/12/18  04:49    


 


RBC  4.49 M/mm3 (3.65-5.03)   02/12/18  04:49    


 


Hgb  11.1 gm/dl (11.8-15.2)  L  02/12/18  04:49    


 


Hct  35.0 % (35.5-45.6)  L  02/12/18  04:49    


 


MCV  78 fl (84-94)  L  02/12/18  04:49    


 


MCH  25 pg (28-32)  L  02/12/18  04:49    


 


MCHC  32 % (32-34)   02/12/18  04:49    


 


RDW  24.4 % (13.2-15.2)  H  02/12/18  04:49    


 


Plt Count  67 K/mm3 (140-440)  L  02/12/18  04:49    


 


Add Manual Diff  Complete   02/12/18  04:49    


 


Total Counted  100   02/12/18  04:49    


 


Seg Neutrophils %  Np   02/12/18  04:49    


 


Seg Neuts % (Manual)  96.0 % (40.0-70.0)  H  02/12/18  04:49    


 


Band Neutrophils %  3.0 %  02/12/18  04:49    


 


Lymphocytes % (Manual)  0 % (13.4-35.0)  L  02/12/18  04:49    


 


Reactive Lymphs % (Man)  0 %  02/12/18  04:49    


 


Monocytes % (Manual)  0 % (0.0-7.3)   02/12/18  04:49    


 


Eosinophils % (Manual)  0 % (0.0-4.3)   02/12/18  04:49    


 


Basophils % (Manual)  0 % (0.0-1.8)   02/12/18  04:49    


 


Metamyelocytes %  0 %  02/12/18  04:49    


 


Myelocytes %  1.0 %  02/12/18  04:49    


 


Promyelocytes %  0 %  02/12/18  04:49    


 


Blast Cells %  0 %  02/12/18  04:49    


 


Nucleated RBC %  Not Reportable   02/12/18  04:49    


 


Seg Neutrophils # Man  6.6 K/mm3 (1.8-7.7)   02/12/18  04:49    


 


Band Neutrophils #  0.2 K/mm3  02/12/18  04:49    


 


Lymphocytes # (Manual)  0.0 K/mm3 (1.2-5.4)  L  02/12/18  04:49    


 


Abs React Lymphs (Man)  0.0 K/mm3  02/12/18  04:49    


 


Monocytes # (Manual)  0.0 K/mm3 (0.0-0.8)   02/12/18  04:49    


 


Eosinophils # (Manual)  0.0 K/mm3 (0.0-0.4)   02/12/18  04:49    


 


Basophils # (Manual)  0.0 K/mm3 (0.0-0.1)   02/12/18  04:49    


 


Metamyelocytes #  0.0 K/mm3  02/12/18  04:49    


 


Myelocytes #  0.1 K/mm3  02/12/18  04:49    


 


Promyelocytes #  0.0 K/mm3  02/12/18  04:49    


 


Blast Cells #  0.0 K/mm3  02/12/18  04:49    


 


WBC Morphology  Not Reportable   02/12/18  04:49    


 


Hypersegmented Neuts  Not Reportable   02/12/18  04:49    


 


Hyposegmented Neuts  Not Reportable   02/12/18  04:49    


 


Hypogranular Neuts  Not Reportable   02/12/18  04:49    


 


Smudge Cells  Not Reportable   02/12/18  04:49    


 


Toxic Granulation  Not Reportable   02/12/18  04:49    


 


Toxic Vacuolation  Not Reportable   02/12/18  04:49    


 


Dohle Bodies  Not Reportable   02/12/18  04:49    


 


Pelger-Huet Anomaly  Not Reportable   02/12/18  04:49    


 


Afshin Rods  Not Reportable   02/12/18  04:49    


 


Platelet Estimate  Appears decreased   02/12/18  04:49    


 


Clumped Platelets  Not Reportable   02/12/18  04:49    


 


Plt Clumps, EDTA  Not Reportable   02/12/18  04:49    


 


Large Platelets  Not Reportable   02/12/18  04:49    


 


Giant Platelets  Not Reportable   02/12/18  04:49    


 


Platelet Satelliting  Not Reportable   02/12/18  04:49    


 


Plt Morphology Comment  Not Reportable   02/12/18  04:49    


 


RBC Morphology  Not Reportable   02/12/18  04:49    


 


Dimorphic RBCs  Not Reportable   02/12/18  04:49    


 


Polychromasia  Not Reportable   02/12/18  04:49    


 


Hypochromasia  1+   02/12/18  04:49    


 


Poikilocytosis  Not Reportable   02/12/18  04:49    


 


Anisocytosis  2+   02/12/18  04:49    


 


Microcytosis  Not Reportable   02/12/18  04:49    


 


Macrocytosis  Not Reportable   02/12/18  04:49    


 


Spherocytes  Not Reportable   02/12/18  04:49    


 


Pappenheimer Bodies  Not Reportable   02/12/18  04:49    


 


Sickle Cells  Not Reportable   02/12/18  04:49    


 


Target Cells  Few   02/12/18  04:49    


 


Tear Drop Cells  Not Reportable   02/12/18  04:49    


 


Ovalocytes  Not Reportable   02/12/18  04:49    


 


Helmet Cells  Not Reportable   02/12/18  04:49    


 


[place] Bodies  Not Reportable   02/12/18  04:49    


 


Cabot Rings  Not Reportable   02/12/18  04:49    


 


[place] Cells  Not Reportable   02/12/18  04:49    


 


Bite Cells  Not Reportable   02/12/18  04:49    


 


Crenated Cell  Not Reportable   02/12/18  04:49    


 


Elliptocytes  Not Reportable   02/12/18  04:49    


 


Acanthocytes (Spur)  Not Reportable   02/12/18  04:49    


 


Rouleaux  Not Reportable   02/12/18  04:49    


 


Hemoglobin C Crystals  Not Reportable   02/12/18  04:49    


 


Schistocytes  Not Reportable   02/12/18  04:49    


 


Malaria parasites  Not Reportable   02/12/18  04:49    


 


Malik Bodies  Not Reportable   02/12/18  04:49    


 


Hem Pathologist Commnt  No   02/12/18  04:49    


 


PT  16.3 Sec. (12.2-14.9)  H  02/11/18  16:40    


 


INR  1.24  (0.87-1.13)  H  02/11/18  16:40    


 


D-Dimer  1843.68 ng/mlDDU (0-234)  H  02/11/18  16:40    


 


VBG pH  7.395  (7.320-7.420)   02/11/18  16:40    


 


Sodium  140 mmol/L (137-145)   02/12/18  04:49    


 


Potassium  4.8 mmol/L (3.6-5.0)   02/12/18  04:49    


 


Chloride  95.7 mmol/L ()  L  02/12/18  04:49    


 


Carbon Dioxide  31 mmol/L (22-30)  H  02/12/18  04:49    


 


Anion Gap  18 mmol/L  02/12/18  04:49    


 


BUN  29 mg/dL (9-20)  H  02/12/18  04:49    


 


Creatinine  0.5 mg/dL (0.8-1.5)  L  02/12/18  04:49    


 


Estimated GFR  > 60 ml/min  02/12/18  04:49    


 


BUN/Creatinine Ratio  58 %  02/12/18  04:49    


 


Glucose  90 mg/dL ()   02/12/18  04:49    


 


Lactic Acid  1.30 mmol/L (0.7-2.0)   02/11/18  18:10    


 


Calcium  9.4 mg/dL (8.4-10.2)   02/12/18  04:49    


 


Total Bilirubin  1.00 mg/dL (0.1-1.2)   02/11/18  16:40    


 


AST  58 units/L (5-40)  H  02/11/18  16:40    


 


ALT  28 units/L (7-56)   02/11/18  16:40    


 


Alkaline Phosphatase  88 units/L ()   02/11/18  16:40    


 


Ammonia  41.0 umol/L (25-60)   02/11/18  16:40    


 


Total Creatine Kinase  144 units/L ()   02/11/18  16:40    


 


CK-MB (CK-2)  2.1 ng/mL (0.0-4.0)   02/11/18  16:40    


 


CK-MB (CK-2) Rel Index  1.4  (0-4)   02/11/18  16:40    


 


Troponin T  < 0.010 ng/mL (0.00-0.029)   02/11/18  16:40    


 


NT-Pro-B Natriuret Pep  6520 pg/mL (0-900)  H  02/11/18  16:40    


 


Total Protein  5.9 g/dL (6.3-8.2)  L  02/11/18  16:40    


 


Albumin  2.5 g/dL (3.9-5)  L  02/11/18  16:40    


 


Albumin/Globulin Ratio  0.7 %  02/11/18  16:40    


 


Urine Color  Nelly  (Yellow)   02/11/18  18:00    


 


Urine Turbidity  Clear  (Clear)   02/11/18  18:00    


 


Urine pH  6.0  (5.0-7.0)   02/11/18  18:00    


 


Ur Specific Gravity  1.024  (1.003-1.030)   02/11/18  18:00    


 


Urine Protein  <15 mg/dl mg/dL (Negative)   02/11/18  18:00    


 


Urine Glucose (UA)  Neg mg/dL (Negative)   02/11/18  18:00    


 


Urine Ketones  Tr mg/dL (Negative)   02/11/18  18:00    


 


Urine Blood  Neg  (Negative)   02/11/18  18:00    


 


Urine Nitrite  Neg  (Negative)   02/11/18  18:00    


 


Urine Bilirubin  Neg  (Negative)   02/11/18  18:00    


 


Urine Urobilinogen  4.0 mg/dL (<2.0)   02/11/18  18:00    


 


Ur Leukocyte Esterase  Neg  (Negative)   02/11/18  18:00    


 


Urine WBC (Auto)  1.0 /HPF (0.0-6.0)   02/11/18  18:00    


 


Urine RBC (Auto)  3.0 /HPF (0.0-6.0)   02/11/18  18:00    


 


Urine Bacteria (Auto)  4+ /HPF (Negative)   02/11/18  18:00    


 


Urine Mucus  3+ /HPF  02/11/18  18:00

## 2018-02-14 LAB
BUN SERPL-MCNC: 48 MG/DL (ref 9–20)
BUN/CREAT SERPL: 53 %
CALCIUM SERPL-MCNC: 8.5 MG/DL (ref 8.4–10.2)
HEMOLYSIS INDEX: 2
MAGNESIUM SERPL-MCNC: 1.7 MG/DL (ref 1.7–2.3)

## 2018-02-14 RX ADMIN — METOPROLOL TARTRATE SCH MG: 25 TABLET, FILM COATED ORAL at 10:12

## 2018-02-14 RX ADMIN — CEFTRIAXONE SODIUM SCH MLS/10 MIN: 10 INJECTION, POWDER, FOR SOLUTION INTRAVENOUS at 10:13

## 2018-02-14 RX ADMIN — BUDESONIDE SCH MG: 0.5 INHALANT RESPIRATORY (INHALATION) at 08:10

## 2018-02-14 RX ADMIN — BUDESONIDE SCH MG: 0.5 INHALANT RESPIRATORY (INHALATION) at 19:44

## 2018-02-14 RX ADMIN — FOLIC ACID SCH MG: 1 TABLET ORAL at 10:12

## 2018-02-14 RX ADMIN — Medication SCH MG: at 10:13

## 2018-02-14 RX ADMIN — PANTOPRAZOLE SODIUM SCH MG: 40 TABLET, DELAYED RELEASE ORAL at 10:13

## 2018-02-14 RX ADMIN — ENOXAPARIN SODIUM SCH MG: 100 INJECTION SUBCUTANEOUS at 22:00

## 2018-02-14 RX ADMIN — IPRATROPIUM BROMIDE AND ALBUTEROL SULFATE SCH AMPUL: .5; 3 SOLUTION RESPIRATORY (INHALATION) at 19:44

## 2018-02-14 RX ADMIN — FUROSEMIDE SCH MG: 10 INJECTION, SOLUTION INTRAVENOUS at 17:25

## 2018-02-14 RX ADMIN — SPIRONOLACTONE SCH MG: 50 TABLET ORAL at 10:11

## 2018-02-14 RX ADMIN — PREDNISONE SCH MG: 20 TABLET ORAL at 10:12

## 2018-02-14 RX ADMIN — IPRATROPIUM BROMIDE AND ALBUTEROL SULFATE SCH: .5; 3 SOLUTION RESPIRATORY (INHALATION) at 13:57

## 2018-02-14 RX ADMIN — IPRATROPIUM BROMIDE AND ALBUTEROL SULFATE SCH AMPUL: .5; 3 SOLUTION RESPIRATORY (INHALATION) at 08:10

## 2018-02-14 RX ADMIN — FUROSEMIDE SCH MG: 10 INJECTION, SOLUTION INTRAVENOUS at 06:05

## 2018-02-14 NOTE — PROGRESS NOTE
Assessment and Plan


Assessment:


Paroxysmal SVT --> SR 


PNA


Acute diastolic HF


End stage liver cirrhosis / H/o hepatitis C


H/o portopulmonary HTN


COPD


Thrombocytopenia 


H/o ETOH use


HTN


DM


GERD


H/o biopolar disorder & schizophrenia


 


Plan: 


Echo reviewed - EF 55-60%, trace MROri Chavira Lopressor. 


Cont tele. 


Abx per primary. 





The patient has been seen in conjunction with Dr. Jo who agrees with the 

assessment and plan of care.








Subjective


Date of service: 02/14/18


Principal diagnosis: HF; SVT 


Interval history: 


pt resting comfortably in bed, no current cardiac complaints. Tele reviewed - 

pt currently in SR with one bout of SVT overnight. 








Objective





 Last Vital Signs











Temp  98.2 F   02/14/18 05:14


 


Pulse  86   02/14/18 10:00


 


Resp  16   02/14/18 10:00


 


BP  106/57   02/14/18 05:14


 


Pulse Ox  98   02/14/18 10:00








 











- Physical Examination


General: Cachectic


HEENT: Positive: PERRL, Normocephaly, Mucus Membranes Moist


Neck: Positive: neck supple, trachea midline


Cardiac: Positive: Reg Rate and Rhythm, irregularly irregular


Lungs: Positive: Decreased Breath Sounds


Neuro: Positive: Grossly Intact


Abdomen: Negative: Tender


Skin: Negative: Rash


Musculoskeletal: No Fluid Collection, No Pain


Extremities: Absent: edema





- Labs and Meds


 Comprehensive Metabolic Panel











  02/13/18 Range/Units





  10:19 


 


Sodium  139  (137-145)  mmol/L


 


Potassium  4.7  (3.6-5.0)  mmol/L


 


Chloride  95.4 L  ()  mmol/L


 


Carbon Dioxide  28  (22-30)  mmol/L


 


BUN  48 H  (9-20)  mg/dL


 


Creatinine  1.0  D  (0.8-1.5)  mg/dL


 


Glucose  122 H  ()  mg/dL


 


Calcium  9.3  (8.4-10.2)  mg/dL














- Imaging and Cardiology


EKG: report reviewed, image reviewed


Echo: report reviewed (2/2015 showed EF 60-65%, pseudonormalization, LA mildly 

dilated, RV systolic function mildly reduced, RA mildly dilated, RVSP 9mmHg, 

large left sided pleural effusion)





- Telemetry


EKG Rhythm: Sinus Rhythm





- EKG


Sinus rhythms and dysrhythmias: sinus rhythm

## 2018-02-14 NOTE — PROGRESS NOTE
Assessment and Plan


Assessment and plan: 


--Supraventricular tachycardia; resolved


Converted with adenosine, continue beta blockers and supportive care


Cardiology following





--Community-acquired pneumonia; 


continue current antibiotics, oxygen titrated O2 sats more than 90%, supportive 

care





--Acute exacerbation of COPD;


Oxygen, nebulizers, IV steroids, IV antibiotics, pulmonary evaluation as needed





--Chronic Thrombocytopenia; no evidence of bleeding


Closely monitor





--DVT prophylaxis with Lovenox





--DC planning to case management


Discharge to SNF with possible hospice when medically stable





--Full CODE STATUS





Plan of care discussed with the patient, his sister at the bedside and the nurse


Possible discharge in 1-2 days if stable











History


Interval history: 


Patient's seen and evaluated medical records reviewed


Patient feels slightly better no new complaints


Heart rate is within normal range


Patient is alert and awake no new complaints


Denies chest pain or palpitations


Vital signs reviewed





Hospitalist Physical





- Constitutional


Vitals: 


 











Temp Pulse Resp BP Pulse Ox


 


 97.8 F   66   18   115/59   100 


 


 02/14/18 19:57  02/14/18 19:57  02/14/18 19:57  02/14/18 19:57  02/14/18 19:57











General appearance: Present: no acute distress, cachectic, disheveled





- EENT


Eyes: Present: PERRL, EOM intact





- Neck


Neck: Present: supple, normal ROM





- Respiratory


Respiratory effort: normal


Respiratory: bilateral: diminished, negative: rales, rhonchi, wheezing





- Cardiovascular


Rhythm: regular


Heart Sounds: Present: S1 & S2





- Extremities


Extremities: no ischemia, No edema





- Abdominal


General gastrointestinal: soft, non-tender, non-distended, normal bowel sounds





- Integumentary


Integumentary: Present: clear, warm





- Psychiatric


Psychiatric: appropriate mood/affect, cooperative





- Neurologic


Neurologic: CNII-XII intact, moves all extremities





Results





- Labs


CBC & Chem 7: 


 02/12/18 04:49





 02/14/18 11:26


Labs: 


 Laboratory Last Values











WBC  6.9 K/mm3 (4.5-11.0)   02/12/18  04:49    


 


RBC  4.49 M/mm3 (3.65-5.03)   02/12/18  04:49    


 


Hgb  11.1 gm/dl (11.8-15.2)  L  02/12/18  04:49    


 


Hct  35.0 % (35.5-45.6)  L  02/12/18  04:49    


 


MCV  78 fl (84-94)  L  02/12/18  04:49    


 


MCH  25 pg (28-32)  L  02/12/18  04:49    


 


MCHC  32 % (32-34)   02/12/18  04:49    


 


RDW  24.4 % (13.2-15.2)  H  02/12/18  04:49    


 


Plt Count  67 K/mm3 (140-440)  L  02/12/18  04:49    


 


Add Manual Diff  Complete   02/12/18  04:49    


 


Total Counted  100   02/12/18  04:49    


 


Seg Neutrophils %  Np   02/12/18  04:49    


 


Seg Neuts % (Manual)  96.0 % (40.0-70.0)  H  02/12/18  04:49    


 


Band Neutrophils %  3.0 %  02/12/18  04:49    


 


Lymphocytes % (Manual)  0 % (13.4-35.0)  L  02/12/18  04:49    


 


Reactive Lymphs % (Man)  0 %  02/12/18  04:49    


 


Monocytes % (Manual)  0 % (0.0-7.3)   02/12/18  04:49    


 


Eosinophils % (Manual)  0 % (0.0-4.3)   02/12/18  04:49    


 


Basophils % (Manual)  0 % (0.0-1.8)   02/12/18  04:49    


 


Metamyelocytes %  0 %  02/12/18  04:49    


 


Myelocytes %  1.0 %  02/12/18  04:49    


 


Promyelocytes %  0 %  02/12/18  04:49    


 


Blast Cells %  0 %  02/12/18  04:49    


 


Nucleated RBC %  Not Reportable   02/12/18  04:49    


 


Seg Neutrophils # Man  6.6 K/mm3 (1.8-7.7)   02/12/18  04:49    


 


Band Neutrophils #  0.2 K/mm3  02/12/18  04:49    


 


Lymphocytes # (Manual)  0.0 K/mm3 (1.2-5.4)  L  02/12/18  04:49    


 


Abs React Lymphs (Man)  0.0 K/mm3  02/12/18  04:49    


 


Monocytes # (Manual)  0.0 K/mm3 (0.0-0.8)   02/12/18  04:49    


 


Eosinophils # (Manual)  0.0 K/mm3 (0.0-0.4)   02/12/18  04:49    


 


Basophils # (Manual)  0.0 K/mm3 (0.0-0.1)   02/12/18  04:49    


 


Metamyelocytes #  0.0 K/mm3  02/12/18  04:49    


 


Myelocytes #  0.1 K/mm3  02/12/18  04:49    


 


Promyelocytes #  0.0 K/mm3  02/12/18  04:49    


 


Blast Cells #  0.0 K/mm3  02/12/18  04:49    


 


WBC Morphology  Not Reportable   02/12/18  04:49    


 


Hypersegmented Neuts  Not Reportable   02/12/18  04:49    


 


Hyposegmented Neuts  Not Reportable   02/12/18  04:49    


 


Hypogranular Neuts  Not Reportable   02/12/18  04:49    


 


Smudge Cells  Not Reportable   02/12/18  04:49    


 


Toxic Granulation  Not Reportable   02/12/18  04:49    


 


Toxic Vacuolation  Not Reportable   02/12/18  04:49    


 


[place]ohle Bodies  Not Reportable   02/12/18  04:49    


 


Pelger-Huet Anomaly  Not Reportable   02/12/18  04:49    


 


Afshin Rods  Not Reportable   02/12/18  04:49    


 


Platelet Estimate  Appears decreased   02/12/18  04:49    


 


Clumped Platelets  Not Reportable   02/12/18  04:49    


 


Plt Clumps, EDTA  Not Reportable   02/12/18  04:49    


 


Large Platelets  Not Reportable   02/12/18  04:49    


 


Giant Platelets  Not Reportable   02/12/18  04:49    


 


Platelet Satelliting  Not Reportable   02/12/18  04:49    


 


Plt Morphology Comment  Not Reportable   02/12/18  04:49    


 


RBC Morphology  Not Reportable   02/12/18  04:49    


 


Dimorphic RBCs  Not Reportable   02/12/18  04:49    


 


Polychromasia  Not Reportable   02/12/18  04:49    


 


Hypochromasia  1+   02/12/18  04:49    


 


Poikilocytosis  Not Reportable   02/12/18  04:49    


 


Anisocytosis  2+   02/12/18  04:49    


 


Microcytosis  Not Reportable   02/12/18  04:49    


 


Macrocytosis  Not Reportable   02/12/18  04:49    


 


Spherocytes  Not Reportable   02/12/18  04:49    


 


Pappenheimer Bodies  Not Reportable   02/12/18  04:49    


 


Sickle Cells  Not Reportable   02/12/18  04:49    


 


Target Cells  Few   02/12/18  04:49    


 


Tear Drop Cells  Not Reportable   02/12/18  04:49    


 


Ovalocytes  Not Reportable   02/12/18  04:49    


 


Helmet Cells  Not Reportable   02/12/18  04:49    


 


[place] Bodies  Not Reportable   02/12/18  04:49    


 


[place] Rings  Not Reportable   02/12/18  04:49    


 


[place] Cells  Not Reportable   02/12/18  04:49    


 


Bite Cells  Not Reportable   02/12/18  04:49    


 


Crenated Cell  Not Reportable   02/12/18  04:49    


 


Elliptocytes  Not Reportable   02/12/18  04:49    


 


Acanthocytes (Spur)  Not Reportable   02/12/18  04:49    


 


Rouleaux  Not Reportable   02/12/18  04:49    


 


Hemoglobin C Crystals  Not Reportable   02/12/18  04:49    


 


Schistocytes  Not Reportable   02/12/18  04:49    


 


Malaria parasites  Not Reportable   02/12/18  04:49    


 


[place] Bodies  Not Reportable   02/12/18  04:49    


 


Hem Pathologist Commnt  No   02/12/18  04:49    


 


PT  16.3 Sec. (12.2-14.9)  H  02/11/18  16:40    


 


INR  1.24  (0.87-1.13)  H  02/11/18  16:40    


 


D-Dimer  1843.68 ng/mlDDU (0-234)  H  02/11/18  16:40    


 


VBG pH  7.395  (7.320-7.420)   02/11/18  16:40    


 


Sodium  138 mmol/L (137-145)   02/14/18  11:26    


 


Potassium  4.4 mmol/L (3.6-5.0)   02/14/18  11:26    


 


Chloride  94.4 mmol/L ()  L  02/14/18  11:26    


 


Carbon Dioxide  33 mmol/L (22-30)  H  02/14/18  11:26    


 


Anion Gap  15 mmol/L  02/14/18  11:26    


 


BUN  48 mg/dL (9-20)  H  02/14/18  11:26    


 


Creatinine  0.9 mg/dL (0.8-1.5)   02/14/18  11:26    


 


Estimated GFR  > 60 ml/min  02/14/18  11:26    


 


BUN/Creatinine Ratio  53 %  02/14/18  11:26    


 


Glucose  105 mg/dL ()  H  02/14/18  11:26    


 


Lactic Acid  1.30 mmol/L (0.7-2.0)   02/11/18  18:10    


 


Calcium  8.5 mg/dL (8.4-10.2)   02/14/18  11:26    


 


Magnesium  1.70 mg/dL (1.7-2.3)   02/14/18  11:26    


 


Total Bilirubin  1.00 mg/dL (0.1-1.2)   02/11/18  16:40    


 


AST  58 units/L (5-40)  H  02/11/18  16:40    


 


ALT  28 units/L (7-56)   02/11/18  16:40    


 


Alkaline Phosphatase  88 units/L ()   02/11/18  16:40    


 


Ammonia  41.0 umol/L (25-60)   02/11/18  16:40    


 


Total Creatine Kinase  144 units/L ()   02/11/18  16:40    


 


CK-MB (CK-2)  2.1 ng/mL (0.0-4.0)   02/11/18  16:40    


 


CK-MB (CK-2) Rel Index  1.4  (0-4)   02/11/18  16:40    


 


Troponin T  < 0.010 ng/mL (0.00-0.029)   02/11/18  16:40    


 


NT-Pro-B Natriuret Pep  6520 pg/mL (0-900)  H  02/11/18  16:40    


 


Total Protein  5.9 g/dL (6.3-8.2)  L  02/11/18  16:40    


 


Albumin  2.5 g/dL (3.9-5)  L  02/11/18  16:40    


 


Albumin/Globulin Ratio  0.7 %  02/11/18  16:40    


 


TSH  0.868 mlU/mL (0.270-4.200)   02/13/18  10:19    


 


Free T4  0.58 ng/dL (0.76-1.46)  L  02/13/18  10:19    


 


Urine Color  Nelly  (Yellow)   02/11/18  18:00    


 


Urine Turbidity  Clear  (Clear)   02/11/18  18:00    


 


Urine pH  6.0  (5.0-7.0)   02/11/18  18:00    


 


Ur Specific Gravity  1.024  (1.003-1.030)   02/11/18  18:00    


 


Urine Protein  <15 mg/dl mg/dL (Negative)   02/11/18  18:00    


 


Urine Glucose (UA)  Neg mg/dL (Negative)   02/11/18  18:00    


 


Urine Ketones  Tr mg/dL (Negative)   02/11/18  18:00    


 


Urine Blood  Neg  (Negative)   02/11/18  18:00    


 


Urine Nitrite  Neg  (Negative)   02/11/18  18:00    


 


Urine Bilirubin  Neg  (Negative)   02/11/18  18:00    


 


Urine Urobilinogen  4.0 mg/dL (<2.0)   02/11/18  18:00    


 


Ur Leukocyte Esterase  Neg  (Negative)   02/11/18  18:00    


 


Urine WBC (Auto)  1.0 /HPF (0.0-6.0)   02/11/18  18:00    


 


Urine RBC (Auto)  3.0 /HPF (0.0-6.0)   02/11/18  18:00    


 


Urine Bacteria (Auto)  4+ /HPF (Negative)   02/11/18  18:00    


 


Urine Mucus  3+ /HPF  02/11/18  18:00

## 2018-02-15 LAB — INR PPP: 1.32 (ref 0.87–1.13)

## 2018-02-15 RX ADMIN — METOPROLOL TARTRATE SCH MG: 25 TABLET, FILM COATED ORAL at 21:34

## 2018-02-15 RX ADMIN — LORAZEPAM SCH MG: 0.5 TABLET ORAL at 00:04

## 2018-02-15 RX ADMIN — METOPROLOL TARTRATE SCH: 25 TABLET, FILM COATED ORAL at 00:20

## 2018-02-15 RX ADMIN — FUROSEMIDE SCH MG: 10 INJECTION, SOLUTION INTRAVENOUS at 17:34

## 2018-02-15 RX ADMIN — Medication SCH MG: at 15:55

## 2018-02-15 RX ADMIN — IPRATROPIUM BROMIDE AND ALBUTEROL SULFATE SCH AMPUL: .5; 3 SOLUTION RESPIRATORY (INHALATION) at 08:34

## 2018-02-15 RX ADMIN — CEFTRIAXONE SODIUM SCH MLS/10 MIN: 10 INJECTION, POWDER, FOR SOLUTION INTRAVENOUS at 16:03

## 2018-02-15 RX ADMIN — ALPRAZOLAM PRN MG: 0.25 TABLET ORAL at 15:55

## 2018-02-15 RX ADMIN — IPRATROPIUM BROMIDE AND ALBUTEROL SULFATE SCH AMPUL: .5; 3 SOLUTION RESPIRATORY (INHALATION) at 13:45

## 2018-02-15 RX ADMIN — FUROSEMIDE SCH MG: 10 INJECTION, SOLUTION INTRAVENOUS at 17:33

## 2018-02-15 RX ADMIN — FUROSEMIDE SCH MG: 10 INJECTION, SOLUTION INTRAVENOUS at 06:51

## 2018-02-15 RX ADMIN — METOPROLOL TARTRATE SCH MG: 25 TABLET, FILM COATED ORAL at 15:57

## 2018-02-15 RX ADMIN — LORAZEPAM SCH MG: 0.5 TABLET ORAL at 21:34

## 2018-02-15 RX ADMIN — BUDESONIDE SCH MG: 0.5 INHALANT RESPIRATORY (INHALATION) at 08:34

## 2018-02-15 RX ADMIN — ENOXAPARIN SODIUM SCH MG: 100 INJECTION SUBCUTANEOUS at 21:35

## 2018-02-15 RX ADMIN — LORAZEPAM SCH: 0.5 TABLET ORAL at 10:00

## 2018-02-15 RX ADMIN — AZITHROMYCIN SCH MG: 250 TABLET, FILM COATED ORAL at 21:34

## 2018-02-15 RX ADMIN — PANTOPRAZOLE SODIUM SCH MG: 40 TABLET, DELAYED RELEASE ORAL at 15:55

## 2018-02-15 RX ADMIN — FOLIC ACID SCH MG: 1 TABLET ORAL at 15:55

## 2018-02-15 RX ADMIN — BUDESONIDE SCH MG: 0.5 INHALANT RESPIRATORY (INHALATION) at 19:09

## 2018-02-15 RX ADMIN — AZITHROMYCIN SCH MG: 250 TABLET, FILM COATED ORAL at 00:03

## 2018-02-15 RX ADMIN — ALPRAZOLAM PRN MG: 0.25 TABLET ORAL at 15:59

## 2018-02-15 RX ADMIN — SPIRONOLACTONE SCH MG: 50 TABLET ORAL at 15:57

## 2018-02-15 RX ADMIN — IPRATROPIUM BROMIDE AND ALBUTEROL SULFATE SCH AMPUL: .5; 3 SOLUTION RESPIRATORY (INHALATION) at 19:09

## 2018-02-15 NOTE — PROGRESS NOTE
Assessment and Plan


Assessment:


Paroxysmal SVT --> SR 


PNA


Acute diastolic HF


End stage liver cirrhosis / H/o hepatitis C


H/o portopulmonary HTN


COPD


Thrombocytopenia 


H/o ETOH use


HTN


DM


GERD


H/o biopolar disorder & schizophrenia


 


Plan: 


Currently stable cardiac status. Cont present cardiac regimen. 


Pt may discharge home from cardiology standpoint. 


Recommend follow up in our office with Eula Hitchcock NP, within 2 weeks of 

hospital discharge ([phone]). 





The patient has been seen in conjunction with Dr. Jo who agrees with the 

assessment and plan of care.








Subjective


Date of service: 02/15/18


Principal diagnosis: HF; SVT 


Interval history: 


pt resting comfortably in bed, no current cardiac complaints. Tele reviewed - 

pt currently in SR with no additional PSVT noted overnight. 








Objective





 Last Vital Signs











Temp  98.2 F   02/15/18 05:44


 


Pulse  73   02/15/18 08:46


 


Resp  18   02/15/18 08:46


 


BP  114/52   02/15/18 05:44


 


Pulse Ox  96   02/15/18 08:36








 














- Physical Examination


General: Cachectic


HEENT: Positive: PERRL, Normocephaly, Mucus Membranes Moist


Neck: Positive: neck supple, trachea midline


Cardiac: Positive: Reg Rate and Rhythm, S1/S2


Lungs: Positive: Decreased Breath Sounds


Neuro: Positive: Grossly Intact


Abdomen: Negative: Tender


Skin: Negative: Rash


Musculoskeletal: No Fluid Collection, No Pain


Extremities: Absent: edema





- Labs and Meds


 Coagulation











  02/15/18 Range/Units





  08:46 


 


PT  17.1 H  (12.2-14.9)  Sec.


 


INR  1.32 H  (0.87-1.13)  








 Comprehensive Metabolic Panel











  02/14/18 Range/Units





  11:26 


 


Sodium  138  (137-145)  mmol/L


 


Potassium  4.4  (3.6-5.0)  mmol/L


 


Chloride  94.4 L  ()  mmol/L


 


Carbon Dioxide  33 H  (22-30)  mmol/L


 


BUN  48 H  (9-20)  mg/dL


 


Creatinine  0.9  (0.8-1.5)  mg/dL


 


Glucose  105 H  ()  mg/dL


 


Calcium  8.5  (8.4-10.2)  mg/dL














- Imaging and Cardiology


EKG: report reviewed, image reviewed


Echo: report reviewed (2/2015 showed EF 60-65%, pseudonormalization, LA mildly 

dilated, RV systolic function mildly reduced, RA mildly dilated, RVSP 9mmHg, 

large left sided pleural effusion)





- Telemetry


EKG Rhythm: Sinus Rhythm





- EKG


Sinus rhythms and dysrhythmias: sinus rhythm

## 2018-02-15 NOTE — ULTRASOUND REPORT
ULTRASOUND CHEST



History: Right pleural effusion.



Findings: This exam was initially ordered as an ultrasound guided right 

thoracentesis. Initial scan of the right hemithorax demonstrates a 

trace right pleural effusion measuring 14 cc. No safe radiographic 

window for ultrasound-guided thoracentesis.



Impression:

Trace right pleural effusion too small for ultrasound-guided 

thoracentesis.

## 2018-02-15 NOTE — PROGRESS NOTE
Assessment and Plan





Patient weak, emaciated. resting on 3 litres o2. O2 saturation 98%.No acute 

respiratory distress.





- Patient Problems


(1) COPD exacerbation


Current Visit: Yes   Status: Acute   


Plan to address problem: 


O2 3 litres via nasal canula.


 Albuterol/atrovent aerosol treatments q 6 hours.


 Continue 1/V solumedral.


 Continue Zithromax and ceftrioxone.


 Continue Protonix.


 Recommend D/C Lovenox because of thrombocytopenia.


 SCDs.











(2) Pneumonia


Current Visit: Yes   Status: Acute   


Plan to address problem: 


Patient is on Zithromax and ceftrioxone.








(3) CHF (congestive heart failure)


Current Visit: Yes   Status: Acute   


Plan to address problem: 


Patient is on lasix and aldactone.


 Management as per primary care and cardiology.








(4) Elevated d-dimer


Current Visit: Yes   Status: Acute   


Plan to address problem: 


Patient Angio CT of chest negative for PE.








Subjective


Date of service: 02/15/18


Principal diagnosis: HF; SVT 


Interval history: 





Patient weak, emaciated. resting on 3 litres o2. O2 saturation 98%.No acute 

respiratory distress.





Objective


 Vital Signs - 12hr











  02/15/18 02/15/18 02/15/18





  05:44 08:09 08:36


 


Temperature 98.2 F  


 


Pulse Rate 75 76 


 


Pulse Rate [   75





Throughout]   


 


Respiratory 18  





Rate   


 


Respiratory   





Rate [Back]   


 


Respiratory   





Rate [   





Generalized]   


 


Respiratory   18





Rate [   





Throughout]   


 


Blood Pressure 114/52  


 


O2 Sat by Pulse 87 94 96





Oximetry   














  02/15/18 02/15/18 02/15/18





  08:46 10:00 12:50


 


Temperature   98.4 F


 


Pulse Rate  72 71


 


Pulse Rate [ 73  





Throughout]   


 


Respiratory  20 18





Rate   


 


Respiratory  20 





Rate [Back]   


 


Respiratory  20 





Rate [   





Generalized]   


 


Respiratory 18  





Rate [   





Throughout]   


 


Blood Pressure   122/62


 


O2 Sat by Pulse  99 98





Oximetry   














  02/15/18 02/15/18





  13:45 13:56


 


Temperature  


 


Pulse Rate  


 


Pulse Rate [ 87 79





Throughout]  


 


Respiratory  





Rate  


 


Respiratory  





Rate [Back]  


 


Respiratory  





Rate [  





Generalized]  


 


Respiratory 16 16





Rate [  





Throughout]  


 


Blood Pressure  


 


O2 Sat by Pulse  





Oximetry  











Constitutional: no acute distress, alert, other (Cathectic.)


Eyes: non-icteric


ENT: oropharynx moist


Neck: supple, no lymphadenopathy


Ascultation: Bilateral: diminished breath sounds (Prolonged expiratory phase.)


Cardiovascular: regular rate and rhythm


Gastrointestinal: normoactive bowel sounds, soft, non-tender


Integumentary: normal


Extremities: no cyanosis


Neurologic: non-focal exam, pupils equal and round, CN II-XII normal


Psychiatric: depressed


CBC and BMP: 


 02/12/18 04:49





 02/14/18 11:26


ABG, PT/INR, D-dimer: 


PT/INR, D-dimer











PT  17.1 Sec. (12.2-14.9)  H  02/15/18  08:46    


 


INR  1.32  (0.87-1.13)  H  02/15/18  08:46    


 


D-Dimer  1843.68 ng/mlDDU (0-234)  H  02/11/18  16:40    








Abnormal lab findings: 


 Abnormal Labs











  02/11/18 02/11/18 02/11/18





  16:40 16:40 16:40


 


Hgb   


 


Hct   


 


MCV   


 


MCH   


 


MCHC   


 


RDW   


 


Plt Count   


 


Seg Neuts % (Manual)   


 


Lymphocytes % (Manual)   


 


Lymphocytes # (Manual)   


 


PT  16.3 H  


 


INR  1.24 H  


 


D-Dimer  1843.68 H  


 


Potassium   5.1 H 


 


Chloride   97.4 L 


 


Carbon Dioxide   32 H 


 


BUN   30 H 


 


Creatinine   0.5 L 


 


Glucose   


 


AST   58 H 


 


NT-Pro-B Natriuret Pep    6520 H


 


Total Protein   5.9 L 


 


Albumin   2.5 L 


 


Free T4   














  02/11/18 02/12/18 02/12/18





  18:10 04:49 04:49


 


Hgb  9.9 L  11.1 L 


 


Hct  32.3 L  35.0 L 


 


MCV  81 L  78 L 


 


MCH  25 L  25 L 


 


MCHC  31 L  


 


RDW  24.6 H  24.4 H 


 


Plt Count  60 L  67 L 


 


Seg Neuts % (Manual)   96.0 H 


 


Lymphocytes % (Manual)  3.0 L  0 L 


 


Lymphocytes # (Manual)  0.2 L  0.0 L 


 


PT   


 


INR   


 


D-Dimer   


 


Potassium   


 


Chloride    95.7 L


 


Carbon Dioxide    31 H


 


BUN    29 H


 


Creatinine    0.5 L


 


Glucose   


 


AST   


 


NT-Pro-B Natriuret Pep   


 


Total Protein   


 


Albumin   


 


Free T4   














  02/13/18 02/13/18 02/14/18





  10:19 10:19 11:26


 


Hgb   


 


Hct   


 


MCV   


 


MCH   


 


MCHC   


 


RDW   


 


Plt Count   


 


Seg Neuts % (Manual)   


 


Lymphocytes % (Manual)   


 


Lymphocytes # (Manual)   


 


PT   


 


INR   


 


D-Dimer   


 


Potassium   


 


Chloride  95.4 L   94.4 L


 


Carbon Dioxide    33 H


 


BUN  48 H   48 H


 


Creatinine   


 


Glucose  122 H   105 H


 


AST   


 


NT-Pro-B Natriuret Pep   


 


Total Protein   


 


Albumin   


 


Free T4   0.58 L 














  02/15/18





  08:46


 


Hgb 


 


Hct 


 


MCV 


 


MCH 


 


MCHC 


 


RDW 


 


Plt Count 


 


Seg Neuts % (Manual) 


 


Lymphocytes % (Manual) 


 


Lymphocytes # (Manual) 


 


PT  17.1 H


 


INR  1.32 H


 


D-Dimer 


 


Potassium 


 


Chloride 


 


Carbon Dioxide 


 


BUN 


 


Creatinine 


 


Glucose 


 


AST 


 


NT-Pro-B Natriuret Pep 


 


Total Protein 


 


Albumin 


 


Free T4 











Chest x-ray: report reviewed (Right lower lobe infiltrate.), image reviewed


CT scan - chest: report reviewed (No PE. Dense consolidation on right side. 

Moderate right sided effusion.), image reviewed

## 2018-02-15 NOTE — PROGRESS NOTE
Assessment and Plan


Assessment and plan: 


--Supraventricular tachycardia; resolved


Converted with adenosine, continue beta blockers and supportive care


Cardiology cleared for discharge





--Community-acquired pneumonia; 


continue current antibiotics, oxygen titrated O2 sats more than 90%, supportive 

care





--Acute exacerbation of COPD;


Oxygen, nebulizers, IV steroids, IV antibiotics, pulmonary evaluation as needed





--Chronic Thrombocytopenia; no evidence of bleeding


Closely monitor





--DVT prophylaxis with Lovenox





--DC planning to case management


Case discussed with case management and patient's family member 





Discharge patient in transfer to SNF as soon as it is set up





--Full CODE STATUS





Plan of care discussed with the patient, his sister at the bedside and the nurse


Possible discharge in 1-2 days if stable





History


Interval history: 


Patient seen and evaluated medical records reviewed


Patient feels better no new complaints


Cardiology cleared for discharge


Case management evaluated the patient set up for skilled nursing facility 

placement


Patient's sister involved in patient's care


Patient denies chest pain or shortness of breath


Vital signs are stable


Alert and awake responds to simple questions appropriately








Hospitalist Physical





- Constitutional


Vitals: 


 











Temp Pulse Resp BP Pulse Ox


 


 98.3 F   88   19   121/57   98 


 


 02/15/18 21:05  02/15/18 21:05  02/15/18 21:05  02/15/18 21:05  02/15/18 21:05











General appearance: Present: no acute distress, cachectic, disheveled





- EENT


Eyes: Present: PERRL, EOM intact





- Neck


Neck: Present: supple, normal ROM





- Respiratory


Respiratory effort: normal


Respiratory: bilateral: diminished, negative: rales, rhonchi, wheezing





- Cardiovascular


Rhythm: regular


Heart Sounds: Present: S1 & S2





- Extremities


Extremities: no ischemia, No edema





- Abdominal


General gastrointestinal: soft, non-tender, non-distended, normal bowel sounds





- Integumentary


Integumentary: Present: clear, warm





- Psychiatric


Psychiatric: appropriate mood/affect, cooperative





- Neurologic


Neurologic: moves all extremities





Results





- Labs


CBC & Chem 7: 


 02/12/18 04:49





 02/14/18 11:26


Labs: 


 Laboratory Last Values











WBC  6.9 K/mm3 (4.5-11.0)   02/12/18  04:49    


 


RBC  4.49 M/mm3 (3.65-5.03)   02/12/18  04:49    


 


Hgb  11.1 gm/dl (11.8-15.2)  L  02/12/18  04:49    


 


Hct  35.0 % (35.5-45.6)  L  02/12/18  04:49    


 


MCV  78 fl (84-94)  L  02/12/18  04:49    


 


MCH  25 pg (28-32)  L  02/12/18  04:49    


 


MCHC  32 % (32-34)   02/12/18  04:49    


 


RDW  24.4 % (13.2-15.2)  H  02/12/18  04:49    


 


Plt Count  67 K/mm3 (140-440)  L  02/12/18  04:49    


 


Add Manual Diff  Complete   02/12/18  04:49    


 


Total Counted  100   02/12/18  04:49    


 


Seg Neutrophils %  Np   02/12/18  04:49    


 


Seg Neuts % (Manual)  96.0 % (40.0-70.0)  H  02/12/18  04:49    


 


Band Neutrophils %  3.0 %  02/12/18  04:49    


 


Lymphocytes % (Manual)  0 % (13.4-35.0)  L  02/12/18  04:49    


 


Reactive Lymphs % (Man)  0 %  02/12/18  04:49    


 


Monocytes % (Manual)  0 % (0.0-7.3)   02/12/18  04:49    


 


Eosinophils % (Manual)  0 % (0.0-4.3)   02/12/18  04:49    


 


Basophils % (Manual)  0 % (0.0-1.8)   02/12/18  04:49    


 


Metamyelocytes %  0 %  02/12/18  04:49    


 


Myelocytes %  1.0 %  02/12/18  04:49    


 


Promyelocytes %  0 %  02/12/18  04:49    


 


Blast Cells %  0 %  02/12/18  04:49    


 


Nucleated RBC %  Not Reportable   02/12/18  04:49    


 


Seg Neutrophils # Man  6.6 K/mm3 (1.8-7.7)   02/12/18  04:49    


 


Band Neutrophils #  0.2 K/mm3  02/12/18  04:49    


 


Lymphocytes # (Manual)  0.0 K/mm3 (1.2-5.4)  L  02/12/18  04:49    


 


Abs React Lymphs (Man)  0.0 K/mm3  02/12/18  04:49    


 


Monocytes # (Manual)  0.0 K/mm3 (0.0-0.8)   02/12/18  04:49    


 


Eosinophils # (Manual)  0.0 K/mm3 (0.0-0.4)   02/12/18  04:49    


 


Basophils # (Manual)  0.0 K/mm3 (0.0-0.1)   02/12/18  04:49    


 


Metamyelocytes #  0.0 K/mm3  02/12/18  04:49    


 


Myelocytes #  0.1 K/mm3  02/12/18  04:49    


 


Promyelocytes #  0.0 K/mm3  02/12/18  04:49    


 


Blast Cells #  0.0 K/mm3  02/12/18  04:49    


 


WBC Morphology  Not Reportable   02/12/18  04:49    


 


Hypersegmented Neuts  Not Reportable   02/12/18  04:49    


 


Hyposegmented Neuts  Not Reportable   02/12/18  04:49    


 


Hypogranular Neuts  Not Reportable   02/12/18  04:49    


 


Smudge Cells  Not Reportable   02/12/18  04:49    


 


Toxic Granulation  Not Reportable   02/12/18  04:49    


 


Toxic Vacuolation  Not Reportable   02/12/18  04:49    


 


Dohle Bodies  Not Reportable   02/12/18  04:49    


 


Pelger-Huet Anomaly  Not Reportable   02/12/18  04:49    


 


Afshin Rods  Not Reportable   02/12/18  04:49    


 


Platelet Estimate  Appears decreased   02/12/18  04:49    


 


Clumped Platelets  Not Reportable   02/12/18  04:49    


 


Plt Clumps, EDTA  Not Reportable   02/12/18  04:49    


 


Large Platelets  Not Reportable   02/12/18  04:49    


 


Giant Platelets  Not Reportable   02/12/18  04:49    


 


Platelet Satelliting  Not Reportable   02/12/18  04:49    


 


Plt Morphology Comment  Not Reportable   02/12/18  04:49    


 


RBC Morphology  Not Reportable   02/12/18  04:49    


 


Dimorphic RBCs  Not Reportable   02/12/18  04:49    


 


Polychromasia  Not Reportable   02/12/18  04:49    


 


Hypochromasia  1+   02/12/18  04:49    


 


Poikilocytosis  Not Reportable   02/12/18  04:49    


 


Anisocytosis  2+   02/12/18  04:49    


 


Microcytosis  Not Reportable   02/12/18  04:49    


 


Macrocytosis  Not Reportable   02/12/18  04:49    


 


Spherocytes  Not Reportable   02/12/18  04:49    


 


Pappenheimer Bodies  Not Reportable   02/12/18  04:49    


 


Sickle Cells  Not Reportable   02/12/18  04:49    


 


Target Cells  Few   02/12/18  04:49    


 


Tear Drop Cells  Not Reportable   02/12/18  04:49    


 


Ovalocytes  Not Reportable   02/12/18  04:49    


 


Helmet Cells  Not Reportable   02/12/18  04:49    


 


[place] Bodies  Not Reportable   02/12/18  04:49    


 


[place] Rings  Not Reportable   02/12/18  04:49    


 


[place] Cells  Not Reportable   02/12/18  04:49    


 


Bite Cells  Not Reportable   02/12/18  04:49    


 


Crenated Cell  Not Reportable   02/12/18  04:49    


 


Elliptocytes  Not Reportable   02/12/18  04:49    


 


Acanthocytes (Spur)  Not Reportable   02/12/18  04:49    


 


Rouleaux  Not Reportable   02/12/18  04:49    


 


Hemoglobin C Crystals  Not Reportable   02/12/18  04:49    


 


Schistocytes  Not Reportable   02/12/18  04:49    


 


Malaria parasites  Not Reportable   02/12/18  04:49    


 


[place] Bodies  Not Reportable   02/12/18  04:49    


 


Hem Pathologist Commnt  No   02/12/18  04:49    


 


PT  17.1 Sec. (12.2-14.9)  H  02/15/18  08:46    


 


INR  1.32  (0.87-1.13)  H  02/15/18  08:46    


 


D-Dimer  1843.68 ng/mlDDU (0-234)  H  02/11/18  16:40    


 


VBG pH  7.395  (7.320-7.420)   02/11/18  16:40    


 


Sodium  138 mmol/L (137-145)   02/14/18  11:26    


 


Potassium  4.4 mmol/L (3.6-5.0)   02/14/18  11:26    


 


Chloride  94.4 mmol/L ()  L  02/14/18  11:26    


 


Carbon Dioxide  33 mmol/L (22-30)  H  02/14/18  11:26    


 


Anion Gap  15 mmol/L  02/14/18  11:26    


 


BUN  48 mg/dL (9-20)  H  02/14/18  11:26    


 


Creatinine  0.9 mg/dL (0.8-1.5)   02/14/18  11:26    


 


Estimated GFR  > 60 ml/min  02/14/18  11:26    


 


BUN/Creatinine Ratio  53 %  02/14/18  11:26    


 


Glucose  105 mg/dL ()  H  02/14/18  11:26    


 


Lactic Acid  1.30 mmol/L (0.7-2.0)   02/11/18  18:10    


 


Calcium  8.5 mg/dL (8.4-10.2)   02/14/18  11:26    


 


Magnesium  1.70 mg/dL (1.7-2.3)   02/14/18  11:26    


 


Total Bilirubin  1.00 mg/dL (0.1-1.2)   02/11/18  16:40    


 


AST  58 units/L (5-40)  H  02/11/18  16:40    


 


ALT  28 units/L (7-56)   02/11/18  16:40    


 


Alkaline Phosphatase  88 units/L ()   02/11/18  16:40    


 


Ammonia  41.0 umol/L (25-60)   02/11/18  16:40    


 


Total Creatine Kinase  144 units/L ()   02/11/18  16:40    


 


CK-MB (CK-2)  2.1 ng/mL (0.0-4.0)   02/11/18  16:40    


 


CK-MB (CK-2) Rel Index  1.4  (0-4)   02/11/18  16:40    


 


Troponin T  < 0.010 ng/mL (0.00-0.029)   02/11/18  16:40    


 


NT-Pro-B Natriuret Pep  6520 pg/mL (0-900)  H  02/11/18  16:40    


 


Total Protein  5.9 g/dL (6.3-8.2)  L  02/11/18  16:40    


 


Albumin  2.5 g/dL (3.9-5)  L  02/11/18  16:40    


 


Albumin/Globulin Ratio  0.7 %  02/11/18  16:40    


 


TSH  0.868 mlU/mL (0.270-4.200)   02/13/18  10:19    


 


Free T4  0.58 ng/dL (0.76-1.46)  L  02/13/18  10:19    


 


Urine Color  Nelly  (Yellow)   02/11/18  18:00    


 


Urine Turbidity  Clear  (Clear)   02/11/18  18:00    


 


Urine pH  6.0  (5.0-7.0)   02/11/18  18:00    


 


Ur Specific Gravity  1.024  (1.003-1.030)   02/11/18  18:00    


 


Urine Protein  <15 mg/dl mg/dL (Negative)   02/11/18  18:00    


 


Urine Glucose (UA)  Neg mg/dL (Negative)   02/11/18  18:00    


 


Urine Ketones  Tr mg/dL (Negative)   02/11/18  18:00    


 


Urine Blood  Neg  (Negative)   02/11/18  18:00    


 


Urine Nitrite  Neg  (Negative)   02/11/18  18:00    


 


Urine Bilirubin  Neg  (Negative)   02/11/18  18:00    


 


Urine Urobilinogen  4.0 mg/dL (<2.0)   02/11/18  18:00    


 


Ur Leukocyte Esterase  Neg  (Negative)   02/11/18  18:00    


 


Urine WBC (Auto)  1.0 /HPF (0.0-6.0)   02/11/18  18:00    


 


Urine RBC (Auto)  3.0 /HPF (0.0-6.0)   02/11/18  18:00    


 


Urine Bacteria (Auto)  4+ /HPF (Negative)   02/11/18  18:00    


 


Urine Mucus  3+ /HPF  02/11/18  18:00

## 2018-02-16 RX ADMIN — IPRATROPIUM BROMIDE AND ALBUTEROL SULFATE SCH AMPUL: .5; 3 SOLUTION RESPIRATORY (INHALATION) at 07:43

## 2018-02-16 RX ADMIN — BUDESONIDE SCH MG: 0.5 INHALANT RESPIRATORY (INHALATION) at 22:47

## 2018-02-16 RX ADMIN — BUDESONIDE SCH MG: 0.5 INHALANT RESPIRATORY (INHALATION) at 07:43

## 2018-02-16 RX ADMIN — METOPROLOL TARTRATE SCH MG: 25 TABLET, FILM COATED ORAL at 11:07

## 2018-02-16 RX ADMIN — PANTOPRAZOLE SODIUM SCH MG: 40 TABLET, DELAYED RELEASE ORAL at 11:07

## 2018-02-16 RX ADMIN — METOPROLOL TARTRATE SCH MG: 25 TABLET, FILM COATED ORAL at 23:58

## 2018-02-16 RX ADMIN — IPRATROPIUM BROMIDE AND ALBUTEROL SULFATE SCH AMPUL: .5; 3 SOLUTION RESPIRATORY (INHALATION) at 22:47

## 2018-02-16 RX ADMIN — FOLIC ACID SCH MG: 1 TABLET ORAL at 11:13

## 2018-02-16 RX ADMIN — ALPRAZOLAM PRN MG: 0.25 TABLET ORAL at 12:53

## 2018-02-16 RX ADMIN — IPRATROPIUM BROMIDE AND ALBUTEROL SULFATE SCH AMPUL: .5; 3 SOLUTION RESPIRATORY (INHALATION) at 12:59

## 2018-02-16 RX ADMIN — SPIRONOLACTONE SCH MG: 50 TABLET ORAL at 11:07

## 2018-02-16 RX ADMIN — FUROSEMIDE SCH MG: 10 INJECTION, SOLUTION INTRAVENOUS at 07:03

## 2018-02-16 RX ADMIN — ENOXAPARIN SODIUM SCH MG: 100 INJECTION SUBCUTANEOUS at 23:59

## 2018-02-16 RX ADMIN — CEFTRIAXONE SODIUM SCH MLS/10 MIN: 10 INJECTION, POWDER, FOR SOLUTION INTRAVENOUS at 11:13

## 2018-02-16 RX ADMIN — FUROSEMIDE SCH MG: 10 INJECTION, SOLUTION INTRAVENOUS at 17:50

## 2018-02-16 RX ADMIN — LORAZEPAM SCH MG: 0.5 TABLET ORAL at 23:59

## 2018-02-16 RX ADMIN — Medication SCH MG: at 11:07

## 2018-02-16 RX ADMIN — LORAZEPAM SCH MG: 0.5 TABLET ORAL at 11:07

## 2018-02-16 NOTE — PROGRESS NOTE
Assessment and Plan


Assessment and plan: 


--Community-acquired pneumonia; 


continue current antibiotics, oxygen titrated O2 sats more than 90%, supportive 

care





--Supraventricular tachycardia; resolved


Stable on beta blockers, closely monitor





--Acute exacerbation of COPD;


Oxygen, nebulizers, IV steroids, IV antibiotics, pulmonary evaluation as needed





--Chronic Thrombocytopenia; no evidence of bleeding


Closely monitor





--DVT prophylaxis with Lovenox





--DC planning to case management


Level II assessment in progress 


Discharge patient in transfer to SNF as soon as it is set up





--Full CODE STATUS





Plan of care discussed with the patient, his sister at the bedside and the nurse


Possible discharge in 1-2 days if stable





History


Interval history: 


Sincerely and examined medical records reviewed


Feels better no new complaints


Heart rate within normal range


Denies chest pain or shortness of breath


Vital signs reviewed


Awaiting placement, level II assessment in progress








Hospitalist Physical





- Constitutional


Vitals: 


 











Temp Pulse Resp BP Pulse Ox


 


 97.6 F   76   20   118/59   97 


 


 02/16/18 12:51  02/16/18 13:15  02/16/18 13:15  02/16/18 12:51  02/16/18 12:51











General appearance: Present: no acute distress, cachectic, disheveled





- EENT


Eyes: Present: PERRL, EOM intact





- Neck


Neck: Present: supple, normal ROM





- Respiratory


Respiratory effort: normal


Respiratory: bilateral: diminished, negative: rales, rhonchi, wheezing





- Cardiovascular


Rhythm: regular


Heart Sounds: Present: S1 & S2





- Extremities


Extremities: no ischemia, No edema





- Abdominal


General gastrointestinal: soft, non-tender, non-distended, normal bowel sounds





- Integumentary


Integumentary: Present: clear, warm





- Psychiatric


Psychiatric: appropriate mood/affect, cooperative





- Neurologic


Neurologic: CNII-XII intact, moves all extremities





Results





- Labs


CBC & Chem 7: 


 02/12/18 04:49





 02/14/18 11:26


Labs: 


 Laboratory Last Values











WBC  6.9 K/mm3 (4.5-11.0)   02/12/18  04:49    


 


RBC  4.49 M/mm3 (3.65-5.03)   02/12/18  04:49    


 


Hgb  11.1 gm/dl (11.8-15.2)  L  02/12/18  04:49    


 


Hct  35.0 % (35.5-45.6)  L  02/12/18  04:49    


 


MCV  78 fl (84-94)  L  02/12/18  04:49    


 


MCH  25 pg (28-32)  L  02/12/18  04:49    


 


MCHC  32 % (32-34)   02/12/18  04:49    


 


RDW  24.4 % (13.2-15.2)  H  02/12/18  04:49    


 


Plt Count  67 K/mm3 (140-440)  L  02/12/18  04:49    


 


Add Manual Diff  Complete   02/12/18  04:49    


 


Total Counted  100   02/12/18  04:49    


 


Seg Neutrophils %  Np   02/12/18  04:49    


 


Seg Neuts % (Manual)  96.0 % (40.0-70.0)  H  02/12/18  04:49    


 


Band Neutrophils %  3.0 %  02/12/18  04:49    


 


Lymphocytes % (Manual)  0 % (13.4-35.0)  L  02/12/18  04:49    


 


Reactive Lymphs % (Man)  0 %  02/12/18  04:49    


 


Monocytes % (Manual)  0 % (0.0-7.3)   02/12/18  04:49    


 


Eosinophils % (Manual)  0 % (0.0-4.3)   02/12/18  04:49    


 


Basophils % (Manual)  0 % (0.0-1.8)   02/12/18  04:49    


 


Metamyelocytes %  0 %  02/12/18  04:49    


 


Myelocytes %  1.0 %  02/12/18  04:49    


 


Promyelocytes %  0 %  02/12/18  04:49    


 


Blast Cells %  0 %  02/12/18  04:49    


 


Nucleated RBC %  Not Reportable   02/12/18  04:49    


 


Seg Neutrophils # Man  6.6 K/mm3 (1.8-7.7)   02/12/18  04:49    


 


Band Neutrophils #  0.2 K/mm3  02/12/18  04:49    


 


Lymphocytes # (Manual)  0.0 K/mm3 (1.2-5.4)  L  02/12/18  04:49    


 


Abs React Lymphs (Man)  0.0 K/mm3  02/12/18  04:49    


 


Monocytes # (Manual)  0.0 K/mm3 (0.0-0.8)   02/12/18  04:49    


 


Eosinophils # (Manual)  0.0 K/mm3 (0.0-0.4)   02/12/18  04:49    


 


Basophils # (Manual)  0.0 K/mm3 (0.0-0.1)   02/12/18  04:49    


 


Metamyelocytes #  0.0 K/mm3  02/12/18  04:49    


 


Myelocytes #  0.1 K/mm3  02/12/18  04:49    


 


Promyelocytes #  0.0 K/mm3  02/12/18  04:49    


 


Blast Cells #  0.0 K/mm3  02/12/18  04:49    


 


WBC Morphology  Not Reportable   02/12/18  04:49    


 


Hypersegmented Neuts  Not Reportable   02/12/18  04:49    


 


Hyposegmented Neuts  Not Reportable   02/12/18  04:49    


 


Hypogranular Neuts  Not Reportable   02/12/18  04:49    


 


Smudge Cells  Not Reportable   02/12/18  04:49    


 


Toxic Granulation  Not Reportable   02/12/18  04:49    


 


Toxic Vacuolation  Not Reportable   02/12/18  04:49    


 


Dohle Bodies  Not Reportable   02/12/18  04:49    


 


Pelger-Huet Anomaly  Not Reportable   02/12/18  04:49    


 


Afshin Rods  Not Reportable   02/12/18  04:49    


 


Platelet Estimate  Appears decreased   02/12/18  04:49    


 


Clumped Platelets  Not Reportable   02/12/18  04:49    


 


Plt Clumps, EDTA  Not Reportable   02/12/18  04:49    


 


Large Platelets  Not Reportable   02/12/18  04:49    


 


Giant Platelets  Not Reportable   02/12/18  04:49    


 


Platelet Satelliting  Not Reportable   02/12/18  04:49    


 


Plt Morphology Comment  Not Reportable   02/12/18  04:49    


 


RBC Morphology  Not Reportable   02/12/18  04:49    


 


Dimorphic RBCs  Not Reportable   02/12/18  04:49    


 


Polychromasia  Not Reportable   02/12/18  04:49    


 


Hypochromasia  1+   02/12/18  04:49    


 


Poikilocytosis  Not Reportable   02/12/18  04:49    


 


Anisocytosis  2+   02/12/18  04:49    


 


Microcytosis  Not Reportable   02/12/18  04:49    


 


Macrocytosis  Not Reportable   02/12/18  04:49    


 


Spherocytes  Not Reportable   02/12/18  04:49    


 


Pappenheimer Bodies  Not Reportable   02/12/18  04:49    


 


Sickle Cells  Not Reportable   02/12/18  04:49    


 


Target Cells  Few   02/12/18  04:49    


 


Tear Drop Cells  Not Reportable   02/12/18  04:49    


 


Ovalocytes  Not Reportable   02/12/18  04:49    


 


Helmet Cells  Not Reportable   02/12/18  04:49    


 


Davidson-Omak Bodies  Not Reportable   02/12/18  04:49    


 


Cabot Rings  Not Reportable   02/12/18  04:49    


 


Ravin Cells  Not Reportable   02/12/18  04:49    


 


Bite Cells  Not Reportable   02/12/18  04:49    


 


Crenated Cell  Not Reportable   02/12/18  04:49    


 


Elliptocytes  Not Reportable   02/12/18  04:49    


 


Acanthocytes (Spur)  Not Reportable   02/12/18  04:49    


 


Rouleaux  Not Reportable   02/12/18  04:49    


 


Hemoglobin C Crystals  Not Reportable   02/12/18  04:49    


 


Schistocytes  Not Reportable   02/12/18  04:49    


 


Malaria parasites  Not Reportable   02/12/18  04:49    


 


Malik Bodies  Not Reportable   02/12/18  04:49    


 


Hem Pathologist Commnt  No   02/12/18  04:49    


 


PT  17.1 Sec. (12.2-14.9)  H  02/15/18  08:46    


 


INR  1.32  (0.87-1.13)  H  02/15/18  08:46    


 


D-Dimer  1843.68 ng/mlDDU (0-234)  H  02/11/18  16:40    


 


VBG pH  7.395  (7.320-7.420)   02/11/18  16:40    


 


Sodium  138 mmol/L (137-145)   02/14/18  11:26    


 


Potassium  4.4 mmol/L (3.6-5.0)   02/14/18  11:26    


 


Chloride  94.4 mmol/L ()  L  02/14/18  11:26    


 


Carbon Dioxide  33 mmol/L (22-30)  H  02/14/18  11:26    


 


Anion Gap  15 mmol/L  02/14/18  11:26    


 


BUN  48 mg/dL (9-20)  H  02/14/18  11:26    


 


Creatinine  0.9 mg/dL (0.8-1.5)   02/14/18  11:26    


 


Estimated GFR  > 60 ml/min  02/14/18  11:26    


 


BUN/Creatinine Ratio  53 %  02/14/18  11:26    


 


Glucose  105 mg/dL ()  H  02/14/18  11:26    


 


Lactic Acid  1.30 mmol/L (0.7-2.0)   02/11/18  18:10    


 


Calcium  8.5 mg/dL (8.4-10.2)   02/14/18  11:26    


 


Magnesium  1.70 mg/dL (1.7-2.3)   02/14/18  11:26    


 


Total Bilirubin  1.00 mg/dL (0.1-1.2)   02/11/18  16:40    


 


AST  58 units/L (5-40)  H  02/11/18  16:40    


 


ALT  28 units/L (7-56)   02/11/18  16:40    


 


Alkaline Phosphatase  88 units/L ()   02/11/18  16:40    


 


Ammonia  41.0 umol/L (25-60)   02/11/18  16:40    


 


Total Creatine Kinase  144 units/L ()   02/11/18  16:40    


 


CK-MB (CK-2)  2.1 ng/mL (0.0-4.0)   02/11/18  16:40    


 


CK-MB (CK-2) Rel Index  1.4  (0-4)   02/11/18  16:40    


 


Troponin T  < 0.010 ng/mL (0.00-0.029)   02/11/18  16:40    


 


NT-Pro-B Natriuret Pep  6520 pg/mL (0-900)  H  02/11/18  16:40    


 


Total Protein  5.9 g/dL (6.3-8.2)  L  02/11/18  16:40    


 


Albumin  2.5 g/dL (3.9-5)  L  02/11/18  16:40    


 


Albumin/Globulin Ratio  0.7 %  02/11/18  16:40    


 


TSH  0.868 mlU/mL (0.270-4.200)   02/13/18  10:19    


 


Free T4  0.58 ng/dL (0.76-1.46)  L  02/13/18  10:19    


 


Urine Color  Nelly  (Yellow)   02/11/18  18:00    


 


Urine Turbidity  Clear  (Clear)   02/11/18  18:00    


 


Urine pH  6.0  (5.0-7.0)   02/11/18  18:00    


 


Ur Specific Gravity  1.024  (1.003-1.030)   02/11/18  18:00    


 


Urine Protein  <15 mg/dl mg/dL (Negative)   02/11/18  18:00    


 


Urine Glucose (UA)  Neg mg/dL (Negative)   02/11/18  18:00    


 


Urine Ketones  Tr mg/dL (Negative)   02/11/18  18:00    


 


Urine Blood  Neg  (Negative)   02/11/18  18:00    


 


Urine Nitrite  Neg  (Negative)   02/11/18  18:00    


 


Urine Bilirubin  Neg  (Negative)   02/11/18  18:00    


 


Urine Urobilinogen  4.0 mg/dL (<2.0)   02/11/18  18:00    


 


Ur Leukocyte Esterase  Neg  (Negative)   02/11/18  18:00    


 


Urine WBC (Auto)  1.0 /HPF (0.0-6.0)   02/11/18  18:00    


 


Urine RBC (Auto)  3.0 /HPF (0.0-6.0)   02/11/18  18:00    


 


Urine Bacteria (Auto)  4+ /HPF (Negative)   02/11/18  18:00    


 


Urine Mucus  3+ /HPF  02/11/18  18:00

## 2018-02-16 NOTE — PROGRESS NOTE
Assessment and Plan





Patients condition same.Patient weak, emaciated. resting on 3 litres o2. O2 

saturation 97%.No acute respiratory distress.





- Patient Problems


(1) COPD exacerbation


Current Visit: Yes   Status: Acute   





(2) Pneumonia


Current Visit: Yes   Status: Acute   


Plan to address problem: 


Patient is on Zithromax and ceftrioxone.








(3) CHF (congestive heart failure)


Current Visit: Yes   Status: Acute   


Plan to address problem: 


Patient is on lasix and aldactone.


 Management as per primary care and cardiology.








(4) Elevated d-dimer


Current Visit: Yes   Status: Acute   


Plan to address problem: 


Patient Angio CT of chest negative for PE.








Subjective


Date of service: 02/16/18


Principal diagnosis: HF; SVT 


Interval history: 





Patients condition same.Patient weak, emaciated. resting on 3 litres o2. O2 

saturation 97%.No acute respiratory distress.





Objective


 Vital Signs - 12hr











  02/16/18 02/16/18 02/16/18





  05:19 07:42 07:49


 


Temperature 98.4 F  97.6 F


 


Pulse Rate 62  54 L


 


Pulse Rate [  53 L 





Anterior   





Bilateral   





Throughout]   


 


Respiratory 19  20





Rate   


 


Respiratory  20 





Rate [Anterior   





Bilateral   





Throughout]   


 


Respiratory   





Rate [Back]   


 


Blood Pressure 124/66  139/80


 


Blood Pressure   





[Right]   


 


O2 Sat by Pulse 97 98 88





Oximetry   














  02/16/18 02/16/18 02/16/18





  08:01 10:00 11:07


 


Temperature   


 


Pulse Rate   60


 


Pulse Rate [ 76  





Anterior   





Bilateral   





Throughout]   


 


Respiratory   





Rate   


 


Respiratory 20  





Rate [Anterior   





Bilateral   





Throughout]   


 


Respiratory  22 





Rate [Back]   


 


Blood Pressure   140/78


 


Blood Pressure   





[Right]   


 


O2 Sat by Pulse   





Oximetry   














  02/16/18 02/16/18 02/16/18





  12:51 12:58 13:15


 


Temperature 97.6 F  


 


Pulse Rate 87  


 


Pulse Rate [  72 76





Anterior   





Bilateral   





Throughout]   


 


Respiratory 20  





Rate   


 


Respiratory  20 20





Rate [Anterior   





Bilateral   





Throughout]   


 


Respiratory   





Rate [Back]   


 


Blood Pressure   


 


Blood Pressure 118/59  





[Right]   


 


O2 Sat by Pulse 97  





Oximetry   











Constitutional: no acute distress, alert, other (Cathectic.)


Eyes: non-icteric


ENT: oropharynx moist


Neck: supple, no lymphadenopathy


Ascultation: Bilateral: diminished breath sounds (Prolonged expiratory phase.)


Cardiovascular: regular rate and rhythm


Gastrointestinal: normoactive bowel sounds, soft, non-tender


Integumentary: normal


Extremities: no cyanosis


Neurologic: non-focal exam, pupils equal and round, CN II-XII normal


Psychiatric: depressed


CBC and BMP: 


 02/12/18 04:49





 02/14/18 11:26


ABG, PT/INR, D-dimer: 


PT/INR, D-dimer











PT  17.1 Sec. (12.2-14.9)  H  02/15/18  08:46    


 


INR  1.32  (0.87-1.13)  H  02/15/18  08:46    


 


D-Dimer  1843.68 ng/mlDDU (0-234)  H  02/11/18  16:40    








Abnormal lab findings: 


 Abnormal Labs











  02/11/18 02/11/18 02/11/18





  16:40 16:40 16:40


 


Hgb   


 


Hct   


 


MCV   


 


MCH   


 


MCHC   


 


RDW   


 


Plt Count   


 


Seg Neuts % (Manual)   


 


Lymphocytes % (Manual)   


 


Lymphocytes # (Manual)   


 


PT  16.3 H  


 


INR  1.24 H  


 


D-Dimer  1843.68 H  


 


Potassium   5.1 H 


 


Chloride   97.4 L 


 


Carbon Dioxide   32 H 


 


BUN   30 H 


 


Creatinine   0.5 L 


 


Glucose   


 


AST   58 H 


 


NT-Pro-B Natriuret Pep    6520 H


 


Total Protein   5.9 L 


 


Albumin   2.5 L 


 


Free T4   














  02/11/18 02/12/18 02/12/18





  18:10 04:49 04:49


 


Hgb  9.9 L  11.1 L 


 


Hct  32.3 L  35.0 L 


 


MCV  81 L  78 L 


 


MCH  25 L  25 L 


 


MCHC  31 L  


 


RDW  24.6 H  24.4 H 


 


Plt Count  60 L  67 L 


 


Seg Neuts % (Manual)   96.0 H 


 


Lymphocytes % (Manual)  3.0 L  0 L 


 


Lymphocytes # (Manual)  0.2 L  0.0 L 


 


PT   


 


INR   


 


D-Dimer   


 


Potassium   


 


Chloride    95.7 L


 


Carbon Dioxide    31 H


 


BUN    29 H


 


Creatinine    0.5 L


 


Glucose   


 


AST   


 


NT-Pro-B Natriuret Pep   


 


Total Protein   


 


Albumin   


 


Free T4   














  02/13/18 02/13/18 02/14/18





  10:19 10:19 11:26


 


Hgb   


 


Hct   


 


MCV   


 


MCH   


 


MCHC   


 


RDW   


 


Plt Count   


 


Seg Neuts % (Manual)   


 


Lymphocytes % (Manual)   


 


Lymphocytes # (Manual)   


 


PT   


 


INR   


 


D-Dimer   


 


Potassium   


 


Chloride  95.4 L   94.4 L


 


Carbon Dioxide    33 H


 


BUN  48 H   48 H


 


Creatinine   


 


Glucose  122 H   105 H


 


AST   


 


NT-Pro-B Natriuret Pep   


 


Total Protein   


 


Albumin   


 


Free T4   0.58 L 














  02/15/18





  08:46


 


Hgb 


 


Hct 


 


MCV 


 


MCH 


 


MCHC 


 


RDW 


 


Plt Count 


 


Seg Neuts % (Manual) 


 


Lymphocytes % (Manual) 


 


Lymphocytes # (Manual) 


 


PT  17.1 H


 


INR  1.32 H


 


D-Dimer 


 


Potassium 


 


Chloride 


 


Carbon Dioxide 


 


BUN 


 


Creatinine 


 


Glucose 


 


AST 


 


NT-Pro-B Natriuret Pep 


 


Total Protein 


 


Albumin 


 


Free T4

## 2018-02-17 VITALS — SYSTOLIC BLOOD PRESSURE: 123 MMHG | DIASTOLIC BLOOD PRESSURE: 74 MMHG

## 2018-02-17 RX ADMIN — PANTOPRAZOLE SODIUM SCH MG: 40 TABLET, DELAYED RELEASE ORAL at 11:02

## 2018-02-17 RX ADMIN — METOPROLOL TARTRATE SCH MG: 25 TABLET, FILM COATED ORAL at 11:02

## 2018-02-17 RX ADMIN — BUDESONIDE SCH MG: 0.5 INHALANT RESPIRATORY (INHALATION) at 07:38

## 2018-02-17 RX ADMIN — AZITHROMYCIN SCH MG: 250 TABLET, FILM COATED ORAL at 01:00

## 2018-02-17 RX ADMIN — Medication SCH MG: at 11:01

## 2018-02-17 RX ADMIN — SPIRONOLACTONE SCH: 50 TABLET ORAL at 11:03

## 2018-02-17 RX ADMIN — IPRATROPIUM BROMIDE AND ALBUTEROL SULFATE SCH AMPUL: .5; 3 SOLUTION RESPIRATORY (INHALATION) at 13:16

## 2018-02-17 RX ADMIN — LORAZEPAM SCH MG: 0.5 TABLET ORAL at 11:02

## 2018-02-17 RX ADMIN — FUROSEMIDE SCH MG: 10 INJECTION, SOLUTION INTRAVENOUS at 06:07

## 2018-02-17 RX ADMIN — FOLIC ACID SCH MG: 1 TABLET ORAL at 11:01

## 2018-02-17 RX ADMIN — IPRATROPIUM BROMIDE AND ALBUTEROL SULFATE SCH AMPUL: .5; 3 SOLUTION RESPIRATORY (INHALATION) at 07:38

## 2018-02-17 RX ADMIN — CEFTRIAXONE SODIUM SCH MLS/10 MIN: 10 INJECTION, POWDER, FOR SOLUTION INTRAVENOUS at 11:02

## 2018-02-17 NOTE — EVENT NOTE
Date: 02/17/18


Patient has room full of family members who requested to talk to me


and explain  patient's condition, prognosis and plan of care


Patient and his son the POA, are present in the room and wanted me to explain 

to the family


Reviewed patient's medical chart, discussed in detail patient's condition, 

treatment plan, poor prognosis


Hospital course, different investigations and workup, consults evaluations and 

recommendations.


and answered all their questions.


Son Mr. Morgan Gupta KEVIN Harley, confirmed in front of the family.  That his 

father did not want  CPR


Did not want to be intubated, did not want tube feeding or aggressive 

management.


And the current management be  Continued.


Patient's nurse was at the bedside


Total critical care time 45 minutes.

## 2018-02-17 NOTE — PROGRESS NOTE
Assessment and Plan


Acute hypoxic respiratory failure


Aspiration pneumonia 


End stage lung disease, on imaging


Right lung atelectsis, with lower lobe infitrate


Healthcare associated pneumonia 


Acute on chronic diastolic CHF


Cirrhosis


COPD exacerbation


Thrombocytopenia


Protein calorie malnutrition severe





-Continue current therapies.


-Bronchodilators


-VTE prophylaxis- SCDS


-Supplemental oxygen to keep O2 sats>90%


-Nutritional support.


-Bronchodilators


-Monitor closely, at risk for acute decompensation





Patient apparently is refusing all his food, and medications.


He is now AND and family will pursue discussion with hospice for home hospice 

on discharge





Subjective


Date of service: 02/17/18


Principal diagnosis: HF; SVT 


Interval history: 





Follow up for acute hypoxic respiratory failure, right lung pneumonia, right 

pleural effusion, AECOPD





Seen and examined.


Vitals, labs, medications, chart reviewed.


Family at the bedside


As per RN, patient is refusing any food. 





Objective





- Exam


Narrative Exam: 


Gen. appearance: Patient lying in bed in no acute distress, chronically ill 

looking


HEENT: Normocephalic/atraumatic, pupils equal round reactive to light, extra 

occular movement intact, no scleral icterus, no JVD or thyromegaly or nodule, 

neck is supple, mucous membrane moist, no erythema or exudate


Heart: S1-S2, regular rate and rhythm


Lungs: Crackles with decreased breath sounds l breathing comfortable


Abdomen: Positive bowel sounds, nontender, nondistended, no organomegaly


Extremities: No edema, cyanosis, clubbing


Neuro:: Oriented 3 , cranial nerves II-12 intact, speech, motor intact


Skin: No rash, nodules, warm dry





 Vital Signs - 12hr











  02/16/18 02/16/18 02/16/18





  22:47 23:06 23:51


 


Temperature   97.7 F


 


Pulse Rate   


 


Pulse Rate [ 69 70 





Throughout]   


 


Respiratory   





Rate   


 


Respiratory 13 14 





Rate [   





Throughout]   


 


Blood Pressure   143/53


 


Blood Pressure   





[Right]   


 


O2 Sat by Pulse   





Oximetry   














  02/16/18 02/16/18 02/17/18





  23:56 23:58 06:52


 


Temperature   97.7 F


 


Pulse Rate 71 71 74


 


Pulse Rate [   





Throughout]   


 


Respiratory   





Rate   


 


Respiratory   





Rate [   





Throughout]   


 


Blood Pressure  143/53 


 


Blood Pressure   130/82





[Right]   


 


O2 Sat by Pulse   





Oximetry   














  02/17/18





  08:13


 


Temperature 97.5 F L


 


Pulse Rate 83


 


Pulse Rate [ 





Throughout] 


 


Respiratory 20





Rate 


 


Respiratory 





Rate [ 





Throughout] 


 


Blood Pressure 101/71


 


Blood Pressure 





[Right] 


 


O2 Sat by Pulse 95





Oximetry 











Constitutional: no acute distress, alert, other (Cathectic.)


Eyes: non-icteric


ENT: oropharynx moist


Neck: supple, no lymphadenopathy


Ascultation: Bilateral: diminished breath sounds (Prolonged expiratory phase.)


Cardiovascular: regular rate and rhythm


Gastrointestinal: normoactive bowel sounds, soft, non-tender


Integumentary: normal


Extremities: no cyanosis


Neurologic: non-focal exam, pupils equal and round, CN II-XII normal


Psychiatric: depressed


CBC and BMP: 


 02/12/18 04:49





 02/14/18 11:26


ABG, PT/INR, D-dimer: 


PT/INR, D-dimer











PT  17.1 Sec. (12.2-14.9)  H  02/15/18  08:46    


 


INR  1.32  (0.87-1.13)  H  02/15/18  08:46    


 


D-Dimer  1843.68 ng/mlDDU (0-234)  H  02/11/18  16:40    








Abnormal lab findings: 


 Abnormal Labs











  02/11/18 02/11/18 02/11/18





  16:40 16:40 16:40


 


Hgb   


 


Hct   


 


MCV   


 


MCH   


 


MCHC   


 


RDW   


 


Plt Count   


 


Seg Neuts % (Manual)   


 


Lymphocytes % (Manual)   


 


Lymphocytes # (Manual)   


 


PT  16.3 H  


 


INR  1.24 H  


 


D-Dimer  1843.68 H  


 


Potassium   5.1 H 


 


Chloride   97.4 L 


 


Carbon Dioxide   32 H 


 


BUN   30 H 


 


Creatinine   0.5 L 


 


Glucose   


 


AST   58 H 


 


NT-Pro-B Natriuret Pep    6520 H


 


Total Protein   5.9 L 


 


Albumin   2.5 L 


 


Free T4   














  02/11/18 02/12/18 02/12/18





  18:10 04:49 04:49


 


Hgb  9.9 L  11.1 L 


 


Hct  32.3 L  35.0 L 


 


MCV  81 L  78 L 


 


MCH  25 L  25 L 


 


MCHC  31 L  


 


RDW  24.6 H  24.4 H 


 


Plt Count  60 L  67 L 


 


Seg Neuts % (Manual)   96.0 H 


 


Lymphocytes % (Manual)  3.0 L  0 L 


 


Lymphocytes # (Manual)  0.2 L  0.0 L 


 


PT   


 


INR   


 


D-Dimer   


 


Potassium   


 


Chloride    95.7 L


 


Carbon Dioxide    31 H


 


BUN    29 H


 


Creatinine    0.5 L


 


Glucose   


 


AST   


 


NT-Pro-B Natriuret Pep   


 


Total Protein   


 


Albumin   


 


Free T4   














  02/13/18 02/13/18 02/14/18





  10:19 10:19 11:26


 


Hgb   


 


Hct   


 


MCV   


 


MCH   


 


MCHC   


 


RDW   


 


Plt Count   


 


Seg Neuts % (Manual)   


 


Lymphocytes % (Manual)   


 


Lymphocytes # (Manual)   


 


PT   


 


INR   


 


D-Dimer   


 


Potassium   


 


Chloride  95.4 L   94.4 L


 


Carbon Dioxide    33 H


 


BUN  48 H   48 H


 


Creatinine   


 


Glucose  122 H   105 H


 


AST   


 


NT-Pro-B Natriuret Pep   


 


Total Protein   


 


Albumin   


 


Free T4   0.58 L 














  02/15/18





  08:46


 


Hgb 


 


Hct 


 


MCV 


 


MCH 


 


MCHC 


 


RDW 


 


Plt Count 


 


Seg Neuts % (Manual) 


 


Lymphocytes % (Manual) 


 


Lymphocytes # (Manual) 


 


PT  17.1 H


 


INR  1.32 H


 


D-Dimer 


 


Potassium 


 


Chloride 


 


Carbon Dioxide 


 


BUN 


 


Creatinine 


 


Glucose 


 


AST 


 


NT-Pro-B Natriuret Pep 


 


Total Protein 


 


Albumin 


 


Free T4

## 2018-02-17 NOTE — PROGRESS NOTE
Assessment and Plan


Assessment and plan: 


--Severe malnutrition; patient refusing to eat


Nutrition supplement and supportive.


If unable to eat, consider tube feeding if agreeable with family





--Sepsis secondary to UTI; Escherichia coli, continue Rocephin





--Community-acquired pneumonia; 


Rocephin and Zithromax, oxygen titrated O2 sats more than 90%, supportive care





--Supraventricular tachycardia; resolved, continue beta blockers





--Acute exacerbation of COPD;


Oxygen, nebulizers, IV steroids, IV antibiotics, pulmonary following





--Chronic Thrombocytopenia; no evidence of bleeding





--Cirrhosis and liver; continue supportive care





--DVT prophylaxis with Lovenox





--DC planning to case management


Level II assessment in progress 


Discharge patient in transfer to SNF with hospice[patient was under hospice 

care before]





--DO NOT RESUSCITATE status, as requested by son





Plan of care discussed with the patient, his son over the phone and his sister 

at the bedside and the nurse


Patient is critically ill, Very poor prognosis, patient and family aware





History


Interval history: 


Patient seen and examined medical records reviewed


Patient is cachectic and chronically ill looking, refusing to eat, refusing 

medications


Sister is at  the bedside


Patient is in no acute distress, vital signs reviewed


Patient is critically ill, was under hospice care before


CODE STATUS discussed with the sister


Also discussed with the son over the phone


Reports that his father always wanted to be DO NOT RESUSCITATE


Son Mr.Ronald Gupta , requested DO NOT RESUSCITATE and DO NOT INTUBATE status


Patient's a sister is in agreement








Hospitalist Physical





- Constitutional


Vitals: 


 











Temp Pulse Resp BP Pulse Ox


 


 97.5 F L  83   20   101/71   96 


 


 02/17/18 08:13  02/17/18 11:02  02/17/18 08:13  02/17/18 11:02  02/17/18 10:00











General appearance: Present: no acute distress, cachectic, disheveled





- EENT


Eyes: Present: PERRL, EOM intact





- Neck


Neck: Present: supple, normal ROM





- Respiratory


Respiratory: bilateral: diminished, rhonchi, negative: rales, wheezing





- Cardiovascular


Rhythm: regular


Heart Sounds: Present: S1 & S2 (tachycardia)





- Extremities


Extremities: abnormal (emaciated, chronic changes)





- Abdominal


General gastrointestinal: soft, non-tender, non-distended, normal bowel sounds





- Integumentary


Integumentary: Present: clear, warm





- Psychiatric


Psychiatric: appropriate mood/affect, other (minimally communicative)





- Neurologic


Neurologic: moves all extremities





Results





- Labs


CBC & Chem 7: 


 02/12/18 04:49





 02/14/18 11:26


Labs: 


 Laboratory Last Values











WBC  6.9 K/mm3 (4.5-11.0)   02/12/18  04:49    


 


RBC  4.49 M/mm3 (3.65-5.03)   02/12/18  04:49    


 


Hgb  11.1 gm/dl (11.8-15.2)  L  02/12/18  04:49    


 


Hct  35.0 % (35.5-45.6)  L  02/12/18  04:49    


 


MCV  78 fl (84-94)  L  02/12/18  04:49    


 


MCH  25 pg (28-32)  L  02/12/18  04:49    


 


MCHC  32 % (32-34)   02/12/18  04:49    


 


RDW  24.4 % (13.2-15.2)  H  02/12/18  04:49    


 


Plt Count  67 K/mm3 (140-440)  L  02/12/18  04:49    


 


Add Manual Diff  Complete   02/12/18  04:49    


 


Total Counted  100   02/12/18  04:49    


 


Seg Neutrophils %  Np   02/12/18  04:49    


 


Seg Neuts % (Manual)  96.0 % (40.0-70.0)  H  02/12/18  04:49    


 


Band Neutrophils %  3.0 %  02/12/18  04:49    


 


Lymphocytes % (Manual)  0 % (13.4-35.0)  L  02/12/18  04:49    


 


Reactive Lymphs % (Man)  0 %  02/12/18  04:49    


 


Monocytes % (Manual)  0 % (0.0-7.3)   02/12/18  04:49    


 


Eosinophils % (Manual)  0 % (0.0-4.3)   02/12/18  04:49    


 


Basophils % (Manual)  0 % (0.0-1.8)   02/12/18  04:49    


 


Metamyelocytes %  0 %  02/12/18  04:49    


 


Myelocytes %  1.0 %  02/12/18  04:49    


 


Promyelocytes %  0 %  02/12/18  04:49    


 


Blast Cells %  0 %  02/12/18  04:49    


 


Nucleated RBC %  Not Reportable   02/12/18  04:49    


 


Seg Neutrophils # Man  6.6 K/mm3 (1.8-7.7)   02/12/18  04:49    


 


Band Neutrophils #  0.2 K/mm3  02/12/18  04:49    


 


Lymphocytes # (Manual)  0.0 K/mm3 (1.2-5.4)  L  02/12/18  04:49    


 


Abs React Lymphs (Man)  0.0 K/mm3  02/12/18  04:49    


 


Monocytes # (Manual)  0.0 K/mm3 (0.0-0.8)   02/12/18  04:49    


 


Eosinophils # (Manual)  0.0 K/mm3 (0.0-0.4)   02/12/18  04:49    


 


Basophils # (Manual)  0.0 K/mm3 (0.0-0.1)   02/12/18  04:49    


 


Metamyelocytes #  0.0 K/mm3  02/12/18  04:49    


 


Myelocytes #  0.1 K/mm3  02/12/18  04:49    


 


Promyelocytes #  0.0 K/mm3  02/12/18  04:49    


 


Blast Cells #  0.0 K/mm3  02/12/18  04:49    


 


WBC Morphology  Not Reportable   02/12/18  04:49    


 


Hypersegmented Neuts  Not Reportable   02/12/18  04:49    


 


Hyposegmented Neuts  Not Reportable   02/12/18  04:49    


 


Hypogranular Neuts  Not Reportable   02/12/18  04:49    


 


Smudge Cells  Not Reportable   02/12/18  04:49    


 


Toxic Granulation  Not Reportable   02/12/18  04:49    


 


Toxic Vacuolation  Not Reportable   02/12/18  04:49    


 


Dohle Bodies  Not Reportable   02/12/18  04:49    


 


Pelger-Huet Anomaly  Not Reportable   02/12/18  04:49    


 


Afshin Rods  Not Reportable   02/12/18  04:49    


 


Platelet Estimate  Appears decreased   02/12/18  04:49    


 


Clumped Platelets  Not Reportable   02/12/18  04:49    


 


Plt Clumps, EDTA  Not Reportable   02/12/18  04:49    


 


Large Platelets  Not Reportable   02/12/18  04:49    


 


Giant Platelets  Not Reportable   02/12/18  04:49    


 


Platelet Satelliting  Not Reportable   02/12/18  04:49    


 


Plt Morphology Comment  Not Reportable   02/12/18  04:49    


 


RBC Morphology  Not Reportable   02/12/18  04:49    


 


Dimorphic RBCs  Not Reportable   02/12/18  04:49    


 


Polychromasia  Not Reportable   02/12/18  04:49    


 


Hypochromasia  1+   02/12/18  04:49    


 


Poikilocytosis  Not Reportable   02/12/18  04:49    


 


Anisocytosis  2+   02/12/18  04:49    


 


Microcytosis  Not Reportable   02/12/18  04:49    


 


Macrocytosis  Not Reportable   02/12/18  04:49    


 


Spherocytes  Not Reportable   02/12/18  04:49    


 


Pappenheimer Bodies  Not Reportable   02/12/18  04:49    


 


Sickle Cells  Not Reportable   02/12/18  04:49    


 


Target Cells  Few   02/12/18  04:49    


 


Tear Drop Cells  Not Reportable   02/12/18  04:49    


 


Ovalocytes  Not Reportable   02/12/18  04:49    


 


Helmet Cells  Not Reportable   02/12/18  04:49    


 


[place] Bodies  Not Reportable   02/12/18  04:49    


 


Cabot Rings  Not Reportable   02/12/18  04:49    


 


[place] Cells  Not Reportable   02/12/18  04:49    


 


Bite Cells  Not Reportable   02/12/18  04:49    


 


Crenated Cell  Not Reportable   02/12/18  04:49    


 


Elliptocytes  Not Reportable   02/12/18  04:49    


 


Acanthocytes (Spur)  Not Reportable   02/12/18  04:49    


 


Rouleaux  Not Reportable   02/12/18  04:49    


 


Hemoglobin C Crystals  Not Reportable   02/12/18  04:49    


 


Schistocytes  Not Reportable   02/12/18  04:49    


 


Malaria parasites  Not Reportable   02/12/18  04:49    


 


Malik Bodies  Not Reportable   02/12/18  04:49    


 


Hem Pathologist Commnt  No   02/12/18  04:49    


 


PT  17.1 Sec. (12.2-14.9)  H  02/15/18  08:46    


 


INR  1.32  (0.87-1.13)  H  02/15/18  08:46    


 


D-Dimer  1843.68 ng/mlDDU (0-234)  H  02/11/18  16:40    


 


VBG pH  7.395  (7.320-7.420)   02/11/18  16:40    


 


Sodium  138 mmol/L (137-145)   02/14/18  11:26    


 


Potassium  4.4 mmol/L (3.6-5.0)   02/14/18  11:26    


 


Chloride  94.4 mmol/L ()  L  02/14/18  11:26    


 


Carbon Dioxide  33 mmol/L (22-30)  H  02/14/18  11:26    


 


Anion Gap  15 mmol/L  02/14/18  11:26    


 


BUN  48 mg/dL (9-20)  H  02/14/18  11:26    


 


Creatinine  0.9 mg/dL (0.8-1.5)   02/14/18  11:26    


 


Estimated GFR  > 60 ml/min  02/14/18  11:26    


 


BUN/Creatinine Ratio  53 %  02/14/18  11:26    


 


Glucose  105 mg/dL ()  H  02/14/18  11:26    


 


Lactic Acid  1.30 mmol/L (0.7-2.0)   02/11/18  18:10    


 


Calcium  8.5 mg/dL (8.4-10.2)   02/14/18  11:26    


 


Magnesium  1.70 mg/dL (1.7-2.3)   02/14/18  11:26    


 


Total Bilirubin  1.00 mg/dL (0.1-1.2)   02/11/18  16:40    


 


AST  58 units/L (5-40)  H  02/11/18  16:40    


 


ALT  28 units/L (7-56)   02/11/18  16:40    


 


Alkaline Phosphatase  88 units/L ()   02/11/18  16:40    


 


Ammonia  41.0 umol/L (25-60)   02/11/18  16:40    


 


Total Creatine Kinase  144 units/L ()   02/11/18  16:40    


 


CK-MB (CK-2)  2.1 ng/mL (0.0-4.0)   02/11/18  16:40    


 


CK-MB (CK-2) Rel Index  1.4  (0-4)   02/11/18  16:40    


 


Troponin T  < 0.010 ng/mL (0.00-0.029)   02/11/18  16:40    


 


NT-Pro-B Natriuret Pep  6520 pg/mL (0-900)  H  02/11/18  16:40    


 


Total Protein  5.9 g/dL (6.3-8.2)  L  02/11/18  16:40    


 


Albumin  2.5 g/dL (3.9-5)  L  02/11/18  16:40    


 


Albumin/Globulin Ratio  0.7 %  02/11/18  16:40    


 


TSH  0.868 mlU/mL (0.270-4.200)   02/13/18  10:19    


 


Free T4  0.58 ng/dL (0.76-1.46)  L  02/13/18  10:19    


 


Urine Color  Nelly  (Yellow)   02/11/18  18:00    


 


Urine Turbidity  Clear  (Clear)   02/11/18  18:00    


 


Urine pH  6.0  (5.0-7.0)   02/11/18  18:00    


 


Ur Specific Gravity  1.024  (1.003-1.030)   02/11/18  18:00    


 


Urine Protein  <15 mg/dl mg/dL (Negative)   02/11/18  18:00    


 


Urine Glucose (UA)  Neg mg/dL (Negative)   02/11/18  18:00    


 


Urine Ketones  Tr mg/dL (Negative)   02/11/18  18:00    


 


Urine Blood  Neg  (Negative)   02/11/18  18:00    


 


Urine Nitrite  Neg  (Negative)   02/11/18  18:00    


 


Urine Bilirubin  Neg  (Negative)   02/11/18  18:00    


 


Urine Urobilinogen  4.0 mg/dL (<2.0)   02/11/18  18:00    


 


Ur Leukocyte Esterase  Neg  (Negative)   02/11/18  18:00    


 


Urine WBC (Auto)  1.0 /HPF (0.0-6.0)   02/11/18  18:00    


 


Urine RBC (Auto)  3.0 /HPF (0.0-6.0)   02/11/18  18:00    


 


Urine Bacteria (Auto)  4+ /HPF (Negative)   02/11/18  18:00    


 


Urine Mucus  3+ /HPF  02/11/18  18:00

## 2018-02-17 NOTE — EVENT NOTE
Date: 18


Nurse paged and reported that patient was unresponsive, absent respirations, 

absent heart rate, blood pressures could not be recorded


When I came and evaluated the patient, patient was unresponsive, pupils dilated 

and fixed, no cardiopulmonary activity noted.


Pronounced , time of death 18:04 hours[ 6:04 pm] on 2018


Family notified.

## 2018-02-17 NOTE — XRAY REPORT
Single view chest: Compared to 2/11/18.



History: Right lung infiltrate.



Findings:



Borderline cardiomegaly. Trachea is slightly to the right of the 

midline. Decrease in volume of right lung compared to left. Again 

opacification is noted of the right lower lobe probably related to 

pneumonitis with atelectasis in view of the decrease in volume of right 

lung. There is bilateral apical and upper lobe scarring.



Impression:



Right lower lobe pneumonia with component of probably atelectasis.

## 2018-02-17 NOTE — DEATH SUMMARY
Death Summary





- Providers


Date of service: 18


Consults: 


 





18 21:29


Consult to Dietitian/Nutrition [CONS] Routine 


   Physician Instructions: 


   Reason For Exam: 


   Reason for Consult: Malnutrition


Physical Therapy Evaluation and Treat [CONS] Routine 


   Comment: 


   Reason For Exam: debility





18 21:31


Consult to Physician [CONS] Routine 


   Consulting Provider: MARCELLA JEAN BAPTISTE


   Reason For Exam: chf


   Place consult to:: answering service


   Notified:: yes


   Phone number called:: [phone]


   If yes, spoke with:: ena


   Time called:: 07:41


   Comment:: laz





18 21:32


Consult to Physician [CONS] Routine 


   Consulting Provider: OSITO AMARO


   Reason For Exam: copd


   Place consult to:: answering servivce


   Notified:: yes


   Phone number called:: [phone]


   If yes, spoke with:: yanna


   Time called:: 07:43


   Comment:: laz





18 16:14


Consult to Wound/ET Nurse [CONS] Urgent 


   Reason For Exam: wound eval











Attending: 


AMY J KOCHERLA








- Death summary


Date of admission: 


18 21:51





Date of Death: 18 (6:04pm)


Significant findings: 




















Cause of death:


--Respiratory failure


--Sepsis secondary to urinary tract infection


--Escherichia coli sepsis


--Bilateral community-acquired pneumonia


--Cardiac arrhythmia/supraventricular tachycardia


--Acute exacerbation of COPD


--Chronic thrombocytopenia


--Cirrhosis Liver


--History of bipolar disorder


--History of schizophrenia


--Cachexia


--DO NOT RESUSCITATE status











Disposition: 


Patient , on 2018 pronounced at 6 :04 PM











- Final diagnosis


(1) Acute respiratory failure with hypoxia


Note: 


Final diagnosis:














(2) Sepsis due to pneumonia


Note: 


Final diagnosis:














(3) Sepsis secondary to UTI


Note: 


Final diagnosis:














(4) Cirrhosis of liver


Note: 


Final diagnosis:














(5) Thrombocytopenia


Note: 


Final diagnosis:














(6) Severe protein-calorie malnutrition


Note: 


Final diagnosis:














(7) E-coli UTI


Note: 


Final diagnosis:














(8) Bilateral pneumonia


Note: 


Final diagnosis:














(9) Schizophrenia


Note: 


Final diagnosis:














(10) Bipolar disorder


Note: 


Final diagnosis:














(11) Cachexia


Note: 


Final diagnosis:

## 2020-08-18 NOTE — CONSULTATION
History of Present Illness


Consult date: 02/14/18


History of present illness: 





72-year-old man who has been on hospice, refocus hospice today to come to the 

emergency roomfor evaluation.  He has a history of COPD hep C, cirrhosis, CHF, 

they state that on Wednesday he felt, experienced generalized weakness and has 

a cough productive of brown phlegm, fever chills and shortness of breath.  He 

presented with complaints of SOB and weakness. He reports that due to weakness, 

he has fallen at home several times over the past few weeks. He is a rather 

poor historian and provides no additional details. He denies any chest pain, 

palpitations, n/v, diaphoresis, dizziness or syncope. At one time, he was on 

hospice for end-stage liver disease. He reports that he is currently living 

alone in his home. Following admission, chest CTA negative for PE, showed 

moderate sized right pleural effusion, dense consolidations throughout the RLL 

suggesting severe PNA, severe emphysematous changes, moderate fibrosis in upper 

lung fields.





We have been consulted for management of his COPD with acute exacerbation, 

Healthcare Pneumonia with possible aspiration, hypoxic respiratory failure.





Patient was seen and examined.


Vitals, labs, medications, chart reviewed.


Imaging reviewed





Review Of  Systems:


Constitutional: no weight loss


Ears, eyes, nose, mouth and throat: no nasal congestion, no nasal discharge, no 

sinus pressure, blurry vision, diplopia


Neck: No neck pain or rigidity.


Cardiovascular: No chest pain,  palpitations


Respiratory: + shortness of breath, cough


Gastrointestinal: No abdominal pain, hematochezia


Genitourinary : no dysuria, frequency , hematuria


Musculoskeletal: no muscle ache 


Integumentary: no rash, no pruritis


Neurological: no parathesias, focal weakness


Endocrine: no cold or heat intolerance, no polyuria or polydipsia


Hematologic/Lymphatic: no easy bruising, no easy bleeding, no gland swelling


Allergic/Immunologic: no urticaria, no angioedema.





PAST MEDICAL HISTORY: COPD Hep C, cirrhosis, CHF





PAST SURGICAL HISTORY: None





FAMILY HISTORY: Hypertension





SOCIAL HISTORY: Smoked 1-1/2 packs a day,no alcohol or drugs





Past History


Past Medical History: COPD, heart failure, hepatitis, liver disease (1)


Social history: Lives alone, alcohol abuse





Medications and Allergies


 Allergies











Allergy/AdvReac Type Severity Reaction Status Date / Time


 


Sulfa (Sulfonamide Allergy  Unknown Verified 02/12/15 13:45





Antibiotics)     











 Home Medications











 Medication  Instructions  Recorded  Confirmed  Last Taken  Type


 


Spironolactone [Aldactone] 50 mg PO QDAY #30 tablet 02/18/15 02/12/18 07/29/15 

Rx


 


Ambrisentan [Letairis] 10 mg PO QDAY 02/12/18 02/12/18 Unknown History


 


Cyanocobalamin [Vitamin B-12] 1,000 mcg IM Q30D 02/12/18 02/12/18 Unknown 

History


 


Ipratropium/Albuterol Sulfate 1 ampul IH QID 02/12/18 02/12/18 Unknown History





[DUONEB *Not for PRN Use*]     


 


Omeprazole 20 mg PO QDAY 02/12/18 02/12/18 Unknown History


 


Sildenafil [Revatio] 20 mg PO TID 02/12/18 02/12/18 Unknown History


 


Spironolactone [Aldactone] 50 mg PO QDAY 02/12/18 02/12/18 Unknown History


 


Tiotropium Bromide [Spiriva] 18 mcg IH DAILY 02/12/18 02/12/18 Unknown History











Active Meds: 


Active Medications





Acetaminophen (Tylenol)  650 mg PO Q4H PRN


   PRN Reason: Pain MILD(1-3)/Fever >100.5/HA


Acetaminophen/Hydrocodone Bitart (Norco 5/325)  1 each PO Q6H PRN


   PRN Reason: Pain, Moderate (4-6)


   Last Admin: 02/12/18 22:25 Dose:  1 each


Albuterol (Proventil)  2.5 mg IH QIDRT PRN


   PRN Reason: Shortness Of Breath


Albuterol/Ipratropium (Duoneb *Not For Prn Use*)  1 ampul IH TIDRT [facility]


   Last Admin: 02/14/18 08:10 Dose:  1 ampul


Azithromycin (Zithromax)  500 mg PO Q24H [facility]


   Last Admin: 02/13/18 22:09 Dose:  500 mg


Bisacodyl (Dulcolax)  10 mg [place] QDAY PRN


   PRN Reason: Constipation unrelieved by MOM


Budesonide (Pulmicort)  0.5 mg IH Q12HRT [facility]


   Last Admin: 02/14/18 08:10 Dose:  0.5 mg


Enoxaparin Sodium (Lovenox)  40 mg SUB-Q QDAY@2200 [facility]


   Last Admin: 02/13/18 22:08 Dose:  40 mg


Folic Acid (Folvite)  1 mg PO QDAY [facility]


   Last Admin: 02/14/18 10:12 Dose:  1 mg


Furosemide (Lasix)  40 mg IV 0600,1800 [facility]


   Last Admin: 02/14/18 06:05 Dose:  40 mg


Ceftriaxone Sodium 1 gm/ (Sodium Chloride)  20 mls @ 20 mls/10 min IV Q24HR [facility]


   PRN Reason: Protocol


   Last Admin: 02/14/18 10:13 Dose:  20 mls/10 min


Magnesium Hydroxide (Milk Of Magnesia)  30 ml PO Q4H PRN


   PRN Reason: Constipation


Methylprednisolone Sodium Succinate (Solu-Medrol)  125 mg IV Q8HR [facility]


   Last Admin: 02/14/18 06:05 Dose:  125 mg


Metoprolol Tartrate (Lopressor)  25 mg PO BID [facility]


   Last Admin: 02/14/18 10:12 Dose:  25 mg


Ondansetron HCl (Zofran)  4 mg IV Q8H PRN


   PRN Reason: N/V unrelieved by Reglan


   Last Admin: 02/12/18 14:02 Dose:  4 mg


Oxycodone/Acetaminophen (Percocet 5/325)  1 tab PO Q6H PRN


   PRN Reason: Pain, Moderate (4-6)


   Last Admin: 02/12/18 11:05 Dose:  1 tab


Pantoprazole Sodium (Protonix)  40 mg PO QDAY [facility]


   Last Admin: 02/14/18 10:13 Dose:  40 mg


Prednisone (Deltasone)  20 mg PO QDAY [facility]


   Last Admin: 02/14/18 10:12 Dose:  20 mg


Spironolactone (Aldactone)  50 mg PO QDAY [facility]


   Last Admin: 02/14/18 10:11 Dose:  50 mg


Thiamine HCl (Vitamin B-1)  100 mg PO QDAY [facility]


   Last Admin: 02/14/18 10:13 Dose:  100 mg











Physical Examination


Vital signs: 


 Vital Signs











Temp Pulse Resp BP Pulse Ox


 


 98.0 F   66   16   112/51   93 


 


 02/11/18 15:53  02/11/18 15:53  02/11/18 15:53  02/11/18 15:53  02/11/18 15:53








Gen. appearance: Patient lying in bed in no acute distress


HEENT: Normocephalic/atraumatic, pupils equal round reactive to light, extra 

occular movement intact, no scleral icterus,


 no JVD or thyromegaly or nodule, neck is supple, mucous membrane moist, no 

erythema or exudates


Heart: S1-S2, regular rate and rhythm


Lungs:Decreased AE bilaterally, Crackles with decreased breath sounds worse on 

the left


Abdomen: Positive bowel sounds, non-tender, non-distended, no organomegaly


Extremities: No edema, cyanosis, clubbing


Neuro:: Oriented 3 , cranial nerves II-12 intact, speech, motor intact


Skin: No rash, nodules, warm dry





Results





- Laboratory Findings


CBC and BMP: 


 02/12/18 04:49





 02/14/18 11:26


PT/INR, D-dimer











PT  16.3 Sec. (12.2-14.9)  H  02/11/18  16:40    


 


INR  1.24  (0.87-1.13)  H  02/11/18  16:40    


 


D-Dimer  1843.68 ng/mlDDU (0-234)  H  02/11/18  16:40    








Abnormal lab findings: 


 Abnormal Labs











  02/11/18 02/11/18 02/11/18





  16:40 16:40 16:40


 


Hgb   


 


Hct   


 


MCV   


 


MCH   


 


MCHC   


 


RDW   


 


Plt Count   


 


Seg Neuts % (Manual)   


 


Lymphocytes % (Manual)   


 


Lymphocytes # (Manual)   


 


PT  16.3 H  


 


INR  1.24 H  


 


D-Dimer  1843.68 H  


 


Potassium   5.1 H 


 


Chloride   97.4 L 


 


Carbon Dioxide   32 H 


 


BUN   30 H 


 


Creatinine   0.5 L 


 


Glucose   


 


AST   58 H 


 


NT-Pro-B Natriuret Pep    6520 H


 


Total Protein   5.9 L 


 


Albumin   2.5 L 


 


Free T4   














  02/11/18 02/12/18 02/12/18





  18:10 04:49 04:49


 


Hgb  9.9 L  11.1 L 


 


Hct  32.3 L  35.0 L 


 


MCV  81 L  78 L 


 


MCH  25 L  25 L 


 


MCHC  31 L  


 


RDW  24.6 H  24.4 H 


 


Plt Count  60 L  67 L 


 


Seg Neuts % (Manual)   96.0 H 


 


Lymphocytes % (Manual)  3.0 L  0 L 


 


Lymphocytes # (Manual)  0.2 L  0.0 L 


 


PT   


 


INR   


 


D-Dimer   


 


Potassium   


 


Chloride    95.7 L


 


Carbon Dioxide    31 H


 


BUN    29 H


 


Creatinine    0.5 L


 


Glucose   


 


AST   


 


NT-Pro-B Natriuret Pep   


 


Total Protein   


 


Albumin   


 


Free T4   














  02/13/18 02/13/18 02/14/18





  10:19 10:19 11:26


 


Hgb   


 


Hct   


 


MCV   


 


MCH   


 


MCHC   


 


RDW   


 


Plt Count   


 


Seg Neuts % (Manual)   


 


Lymphocytes % (Manual)   


 


Lymphocytes # (Manual)   


 


PT   


 


INR   


 


D-Dimer   


 


Potassium   


 


Chloride  95.4 L   94.4 L


 


Carbon Dioxide    33 H


 


BUN  48 H   48 H


 


Creatinine   


 


Glucose  122 H   105 H


 


AST   


 


NT-Pro-B Natriuret Pep   


 


Total Protein   


 


Albumin   


 


Free T4   0.58 L 














- Diagnostic Findings


CT scan - chest: image reviewed (Pleural effusion with cavitary lung lesion)





Assessment and Plan


Acute hypoxic respiratory failure


Aspiration pneumonia 


Right pleural effusion, possibly para-pneumonic/empyema


Healthcare associated pneumonia 


Acute on chronic diastolic CHF


Cirrhosis


COPD exacerbation


Thrombocytopenia


Protein calorie malnutrition





-ABG


-Get diagnostic thoracentesis and if it shows empyema with need a large bore 

chest tube for drainage


-Antibiotics for HAP


-Possible lung abscess, will need follow up imaging


-VTE prophylaxis


-Supplemental oxygen to keep O2 sats>90%


-Nutritional support.


-Bronchodilators


-Monitor closely, at risk for acute decompensation Referral to hematology/oncology consult given  BMP ordered  TSH ordered  CT abdomen pelvis ordered  To rule out cancer or hyperthyroidism